# Patient Record
Sex: MALE | Race: WHITE | NOT HISPANIC OR LATINO | Employment: FULL TIME | URBAN - METROPOLITAN AREA
[De-identification: names, ages, dates, MRNs, and addresses within clinical notes are randomized per-mention and may not be internally consistent; named-entity substitution may affect disease eponyms.]

---

## 2019-06-13 ENCOUNTER — HOSPITAL ENCOUNTER (OUTPATIENT)
Dept: RADIOLOGY | Facility: MEDICAL CENTER | Age: 29
End: 2019-06-13

## 2019-06-13 ENCOUNTER — APPOINTMENT (OUTPATIENT)
Dept: RADIOLOGY | Facility: MEDICAL CENTER | Age: 29
DRG: 918 | End: 2019-06-13
Payer: COMMERCIAL

## 2019-06-13 ENCOUNTER — HOSPITAL ENCOUNTER (INPATIENT)
Facility: MEDICAL CENTER | Age: 29
LOS: 4 days | DRG: 918 | End: 2019-06-17
Attending: EMERGENCY MEDICINE | Admitting: SURGERY
Payer: COMMERCIAL

## 2019-06-13 DIAGNOSIS — T63.011A RATTLESNAKE BITE, ACCIDENTAL OR UNINTENTIONAL, INITIAL ENCOUNTER: ICD-10-CM

## 2019-06-13 PROBLEM — T14.90XA TRAUMA: Status: ACTIVE | Noted: 2019-06-13

## 2019-06-13 PROBLEM — Z53.09 CONTRAINDICATION TO DEEP VEIN THROMBOSIS (DVT) PROPHYLAXIS: Status: ACTIVE | Noted: 2019-06-13

## 2019-06-13 LAB
ABO + RH BLD: NORMAL
ABO GROUP BLD: NORMAL
ALBUMIN SERPL BCP-MCNC: 3.9 G/DL (ref 3.2–4.9)
ALBUMIN/GLOB SERPL: 1.6 G/DL
ALP SERPL-CCNC: 39 U/L (ref 30–99)
ALT SERPL-CCNC: 14 U/L (ref 2–50)
ANION GAP SERPL CALC-SCNC: 6 MMOL/L (ref 0–11.9)
APTT PPP: 25.5 SEC (ref 24.7–36)
AST SERPL-CCNC: 24 U/L (ref 12–45)
BILIRUB SERPL-MCNC: 0.7 MG/DL (ref 0.1–1.5)
BLD GP AB SCN SERPL QL: NORMAL
BUN SERPL-MCNC: 20 MG/DL (ref 8–22)
CALCIUM SERPL-MCNC: 8.3 MG/DL (ref 8.5–10.5)
CHLORIDE SERPL-SCNC: 108 MMOL/L (ref 96–112)
CK SERPL-CCNC: 242 U/L (ref 0–154)
CO2 SERPL-SCNC: 25 MMOL/L (ref 20–33)
CREAT SERPL-MCNC: 1.08 MG/DL (ref 0.5–1.4)
ERYTHROCYTE [DISTWIDTH] IN BLOOD BY AUTOMATED COUNT: 39.1 FL (ref 35.9–50)
ETHANOL BLD-MCNC: 0 G/DL
FIBRINOGEN PPP-MCNC: 240 MG/DL (ref 215–460)
GLOBULIN SER CALC-MCNC: 2.4 G/DL (ref 1.9–3.5)
GLUCOSE SERPL-MCNC: 96 MG/DL (ref 65–99)
HCT VFR BLD AUTO: 43.7 % (ref 42–52)
HGB BLD-MCNC: 14.1 G/DL (ref 14–18)
INR PPP: 1.08 (ref 0.87–1.13)
MCH RBC QN AUTO: 28 PG (ref 27–33)
MCHC RBC AUTO-ENTMCNC: 32.3 G/DL (ref 33.7–35.3)
MCV RBC AUTO: 86.9 FL (ref 81.4–97.8)
PLATELET # BLD AUTO: 268 K/UL (ref 164–446)
PMV BLD AUTO: 9.8 FL (ref 9–12.9)
POTASSIUM SERPL-SCNC: 4.2 MMOL/L (ref 3.6–5.5)
PROT SERPL-MCNC: 6.3 G/DL (ref 6–8.2)
PROTHROMBIN TIME: 14.2 SEC (ref 12–14.6)
RBC # BLD AUTO: 5.03 M/UL (ref 4.7–6.1)
RH BLD: NORMAL
SODIUM SERPL-SCNC: 139 MMOL/L (ref 135–145)
WBC # BLD AUTO: 14 K/UL (ref 4.8–10.8)

## 2019-06-13 PROCEDURE — 85384 FIBRINOGEN ACTIVITY: CPT

## 2019-06-13 PROCEDURE — 36415 COLL VENOUS BLD VENIPUNCTURE: CPT

## 2019-06-13 PROCEDURE — 700111 HCHG RX REV CODE 636 W/ 250 OVERRIDE (IP): Performed by: NURSE PRACTITIONER

## 2019-06-13 PROCEDURE — 86850 RBC ANTIBODY SCREEN: CPT

## 2019-06-13 PROCEDURE — 85730 THROMBOPLASTIN TIME PARTIAL: CPT

## 2019-06-13 PROCEDURE — 80053 COMPREHEN METABOLIC PANEL: CPT

## 2019-06-13 PROCEDURE — 700105 HCHG RX REV CODE 258: Performed by: NURSE PRACTITIONER

## 2019-06-13 PROCEDURE — 700102 HCHG RX REV CODE 250 W/ 637 OVERRIDE(OP): Performed by: NURSE PRACTITIONER

## 2019-06-13 PROCEDURE — 99291 CRITICAL CARE FIRST HOUR: CPT

## 2019-06-13 PROCEDURE — 82550 ASSAY OF CK (CPK): CPT

## 2019-06-13 PROCEDURE — 85027 COMPLETE CBC AUTOMATED: CPT

## 2019-06-13 PROCEDURE — 80307 DRUG TEST PRSMV CHEM ANLYZR: CPT

## 2019-06-13 PROCEDURE — 770022 HCHG ROOM/CARE - ICU (200)

## 2019-06-13 PROCEDURE — 85610 PROTHROMBIN TIME: CPT

## 2019-06-13 PROCEDURE — 86901 BLOOD TYPING SEROLOGIC RH(D): CPT

## 2019-06-13 PROCEDURE — G0390 TRAUMA RESPONS W/HOSP CRITI: HCPCS

## 2019-06-13 PROCEDURE — 86900 BLOOD TYPING SEROLOGIC ABO: CPT

## 2019-06-13 PROCEDURE — A9270 NON-COVERED ITEM OR SERVICE: HCPCS | Performed by: NURSE PRACTITIONER

## 2019-06-13 RX ORDER — MORPHINE SULFATE 4 MG/ML
4 INJECTION, SOLUTION INTRAMUSCULAR; INTRAVENOUS
Status: DISCONTINUED | OUTPATIENT
Start: 2019-06-13 | End: 2019-06-16

## 2019-06-13 RX ORDER — ONDANSETRON 2 MG/ML
4 INJECTION INTRAMUSCULAR; INTRAVENOUS EVERY 4 HOURS PRN
Status: DISCONTINUED | OUTPATIENT
Start: 2019-06-13 | End: 2019-06-17 | Stop reason: HOSPADM

## 2019-06-13 RX ORDER — SODIUM CHLORIDE 9 MG/ML
INJECTION, SOLUTION INTRAVENOUS CONTINUOUS
Status: DISCONTINUED | OUTPATIENT
Start: 2019-06-13 | End: 2019-06-16

## 2019-06-13 RX ORDER — DIPHENHYDRAMINE HYDROCHLORIDE 50 MG/ML
50 INJECTION INTRAMUSCULAR; INTRAVENOUS
Status: DISCONTINUED | OUTPATIENT
Start: 2019-06-13 | End: 2019-06-17 | Stop reason: HOSPADM

## 2019-06-13 RX ORDER — OXYCODONE HYDROCHLORIDE 5 MG/1
5 TABLET ORAL
Status: DISCONTINUED | OUTPATIENT
Start: 2019-06-13 | End: 2019-06-17 | Stop reason: HOSPADM

## 2019-06-13 RX ORDER — BISACODYL 10 MG
10 SUPPOSITORY, RECTAL RECTAL
Status: DISCONTINUED | OUTPATIENT
Start: 2019-06-13 | End: 2019-06-17 | Stop reason: HOSPADM

## 2019-06-13 RX ORDER — METHYLPREDNISOLONE SODIUM SUCCINATE 125 MG/2ML
125 INJECTION, POWDER, LYOPHILIZED, FOR SOLUTION INTRAMUSCULAR; INTRAVENOUS
Status: CANCELLED | OUTPATIENT
Start: 2019-06-13

## 2019-06-13 RX ORDER — AMOXICILLIN 250 MG
1 CAPSULE ORAL
Status: DISCONTINUED | OUTPATIENT
Start: 2019-06-13 | End: 2019-06-17 | Stop reason: HOSPADM

## 2019-06-13 RX ORDER — DOCUSATE SODIUM 100 MG/1
100 CAPSULE, LIQUID FILLED ORAL 2 TIMES DAILY
Status: DISCONTINUED | OUTPATIENT
Start: 2019-06-13 | End: 2019-06-17 | Stop reason: HOSPADM

## 2019-06-13 RX ORDER — DIPHENHYDRAMINE HYDROCHLORIDE 50 MG/ML
50 INJECTION INTRAMUSCULAR; INTRAVENOUS
Status: CANCELLED | OUTPATIENT
Start: 2019-06-13

## 2019-06-13 RX ORDER — POLYETHYLENE GLYCOL 3350 17 G/17G
1 POWDER, FOR SOLUTION ORAL 2 TIMES DAILY
Status: DISCONTINUED | OUTPATIENT
Start: 2019-06-13 | End: 2019-06-17 | Stop reason: HOSPADM

## 2019-06-13 RX ORDER — ENEMA 19; 7 G/133ML; G/133ML
1 ENEMA RECTAL
Status: DISCONTINUED | OUTPATIENT
Start: 2019-06-13 | End: 2019-06-17 | Stop reason: HOSPADM

## 2019-06-13 RX ORDER — SODIUM CHLORIDE 9 MG/ML
INJECTION, SOLUTION INTRAVENOUS CONTINUOUS
Status: CANCELLED | OUTPATIENT
Start: 2019-06-13

## 2019-06-13 RX ORDER — AMOXICILLIN 250 MG
1 CAPSULE ORAL NIGHTLY
Status: DISCONTINUED | OUTPATIENT
Start: 2019-06-13 | End: 2019-06-17 | Stop reason: HOSPADM

## 2019-06-13 RX ORDER — OXYCODONE HYDROCHLORIDE 10 MG/1
10 TABLET ORAL
Status: DISCONTINUED | OUTPATIENT
Start: 2019-06-13 | End: 2019-06-16

## 2019-06-13 RX ORDER — ACETAMINOPHEN 500 MG
1000 TABLET ORAL EVERY 6 HOURS
Status: DISCONTINUED | OUTPATIENT
Start: 2019-06-14 | End: 2019-06-17 | Stop reason: HOSPADM

## 2019-06-13 RX ORDER — ACETAMINOPHEN 500 MG
500 TABLET ORAL
COMMUNITY

## 2019-06-13 RX ORDER — METHYLPREDNISOLONE SODIUM SUCCINATE 125 MG/2ML
125 INJECTION, POWDER, LYOPHILIZED, FOR SOLUTION INTRAMUSCULAR; INTRAVENOUS
Status: DISCONTINUED | OUTPATIENT
Start: 2019-06-13 | End: 2019-06-17 | Stop reason: HOSPADM

## 2019-06-13 RX ADMIN — SODIUM CHLORIDE: 9 INJECTION, SOLUTION INTRAVENOUS at 23:02

## 2019-06-13 RX ADMIN — OVINE CROTALIDAE VENOMS IMMUNE FAB 2 VIAL: 1; 1; 1; 1 INJECTION, POWDER, LYOPHILIZED, FOR SOLUTION INTRAVENOUS at 23:40

## 2019-06-13 RX ADMIN — ACETAMINOPHEN 1000 MG: 500 TABLET ORAL at 23:11

## 2019-06-13 ASSESSMENT — LIFESTYLE VARIABLES
ALCOHOL_USE: YES
EVER_SMOKED: NEVER
CONSUMPTION TOTAL: INCOMPLETE
TOTAL SCORE: 0
HAVE PEOPLE ANNOYED YOU BY CRITICIZING YOUR DRINKING: NO
TOTAL SCORE: 0
EVER FELT BAD OR GUILTY ABOUT YOUR DRINKING: NO
DO YOU DRINK ALCOHOL: NO
EVER_SMOKED: NEVER
HAVE YOU EVER FELT YOU SHOULD CUT DOWN ON YOUR DRINKING: NO
TOTAL SCORE: 0
EVER HAD A DRINK FIRST THING IN THE MORNING TO STEADY YOUR NERVES TO GET RID OF A HANGOVER: NO

## 2019-06-13 ASSESSMENT — COPD QUESTIONNAIRES
COPD SCREENING SCORE: 0
IN THE PAST 12 MONTHS DO YOU DO LESS THAN YOU USED TO BECAUSE OF YOUR BREATHING PROBLEMS: DISAGREE/UNSURE
HAVE YOU SMOKED AT LEAST 100 CIGARETTES IN YOUR ENTIRE LIFE: NO/DON'T KNOW
DURING THE PAST 4 WEEKS HOW MUCH DID YOU FEEL SHORT OF BREATH: NONE/LITTLE OF THE TIME
COPD SCREENING SCORE: 0
DO YOU EVER COUGH UP ANY MUCUS OR PHLEGM?: NO/ONLY WITH OCCASIONAL COLDS OR INFECTIONS
HAVE YOU SMOKED AT LEAST 100 CIGARETTES IN YOUR ENTIRE LIFE: NO/DON'T KNOW
DO YOU EVER COUGH UP ANY MUCUS OR PHLEGM?: NO/ONLY WITH OCCASIONAL COLDS OR INFECTIONS
DURING THE PAST 4 WEEKS HOW MUCH DID YOU FEEL SHORT OF BREATH: NONE/LITTLE OF THE TIME

## 2019-06-13 ASSESSMENT — PATIENT HEALTH QUESTIONNAIRE - PHQ9
1. LITTLE INTEREST OR PLEASURE IN DOING THINGS: NOT AT ALL
2. FEELING DOWN, DEPRESSED, IRRITABLE, OR HOPELESS: NOT AT ALL
SUM OF ALL RESPONSES TO PHQ9 QUESTIONS 1 AND 2: 0

## 2019-06-13 NOTE — LETTER
[facility]  GEN SURGERY TAE 4TH  [facility] [facility]  [place] [address]  [phone]     June 17, 2019    Patient: Keny Stovall   YOB: 1990   Date of Visit: 6/13/2019       To Whom It May Concern:    Keny Stovall was seen and treated at [facility] from 6/13/19 to 6/17/19. He is to be excused from work for 2 weeks and will likely require light duty for an additional two weeks until cleared by his primary care provider before returning to work.     Sincerely,     GABRIEL Mast.

## 2019-06-14 LAB
ALBUMIN SERPL BCP-MCNC: 3.7 G/DL (ref 3.2–4.9)
ALBUMIN SERPL BCP-MCNC: 4 G/DL (ref 3.2–4.9)
ALBUMIN/GLOB SERPL: 1.9 G/DL
ALBUMIN/GLOB SERPL: 1.9 G/DL
ALP SERPL-CCNC: 41 U/L (ref 30–99)
ALP SERPL-CCNC: 44 U/L (ref 30–99)
ALT SERPL-CCNC: 13 U/L (ref 2–50)
ALT SERPL-CCNC: 13 U/L (ref 2–50)
ANION GAP SERPL CALC-SCNC: 11 MMOL/L (ref 0–11.9)
ANION GAP SERPL CALC-SCNC: 6 MMOL/L (ref 0–11.9)
APPEARANCE UR: CLEAR
AST SERPL-CCNC: 15 U/L (ref 12–45)
AST SERPL-CCNC: 21 U/L (ref 12–45)
BILIRUB SERPL-MCNC: 0.5 MG/DL (ref 0.1–1.5)
BILIRUB SERPL-MCNC: 0.9 MG/DL (ref 0.1–1.5)
BILIRUB UR QL STRIP.AUTO: NEGATIVE
BUN SERPL-MCNC: 17 MG/DL (ref 8–22)
BUN SERPL-MCNC: 19 MG/DL (ref 8–22)
CALCIUM SERPL-MCNC: 8.3 MG/DL (ref 8.5–10.5)
CALCIUM SERPL-MCNC: 8.4 MG/DL (ref 8.5–10.5)
CHLORIDE SERPL-SCNC: 107 MMOL/L (ref 96–112)
CHLORIDE SERPL-SCNC: 109 MMOL/L (ref 96–112)
CK SERPL-CCNC: 137 U/L (ref 0–154)
CO2 SERPL-SCNC: 21 MMOL/L (ref 20–33)
CO2 SERPL-SCNC: 26 MMOL/L (ref 20–33)
COLOR UR: YELLOW
CREAT SERPL-MCNC: 1.01 MG/DL (ref 0.5–1.4)
CREAT SERPL-MCNC: 1.05 MG/DL (ref 0.5–1.4)
EKG IMPRESSION: NORMAL
EKG IMPRESSION: NORMAL
ERYTHROCYTE [DISTWIDTH] IN BLOOD BY AUTOMATED COUNT: 38.1 FL (ref 35.9–50)
ERYTHROCYTE [DISTWIDTH] IN BLOOD BY AUTOMATED COUNT: 38.4 FL (ref 35.9–50)
ERYTHROCYTE [DISTWIDTH] IN BLOOD BY AUTOMATED COUNT: 39 FL (ref 35.9–50)
FIBRINOGEN PPP-MCNC: 220 MG/DL (ref 215–460)
FIBRINOGEN PPP-MCNC: 237 MG/DL (ref 215–460)
FIBRINOGEN PPP-MCNC: 241 MG/DL (ref 215–460)
FIBRINOGEN PPP-MCNC: 252 MG/DL (ref 215–460)
FIBRINOGEN PPP-MCNC: 266 MG/DL (ref 215–460)
GLOBULIN SER CALC-MCNC: 1.9 G/DL (ref 1.9–3.5)
GLOBULIN SER CALC-MCNC: 2.1 G/DL (ref 1.9–3.5)
GLUCOSE SERPL-MCNC: 112 MG/DL (ref 65–99)
GLUCOSE SERPL-MCNC: 94 MG/DL (ref 65–99)
GLUCOSE UR STRIP.AUTO-MCNC: NEGATIVE MG/DL
HCT VFR BLD AUTO: 40.3 % (ref 42–52)
HCT VFR BLD AUTO: 40.9 % (ref 42–52)
HCT VFR BLD AUTO: 44.3 % (ref 42–52)
HGB BLD-MCNC: 12.7 G/DL (ref 14–18)
HGB BLD-MCNC: 13.6 G/DL (ref 14–18)
HGB BLD-MCNC: 14.7 G/DL (ref 14–18)
INR PPP: 1.09 (ref 0.87–1.13)
INR PPP: 1.11 (ref 0.87–1.13)
INR PPP: 1.11 (ref 0.87–1.13)
INR PPP: 1.13 (ref 0.87–1.13)
INR PPP: 1.13 (ref 0.87–1.13)
KETONES UR STRIP.AUTO-MCNC: 15 MG/DL
LEUKOCYTE ESTERASE UR QL STRIP.AUTO: NEGATIVE
MCH RBC QN AUTO: 27.4 PG (ref 27–33)
MCH RBC QN AUTO: 28.4 PG (ref 27–33)
MCH RBC QN AUTO: 28.5 PG (ref 27–33)
MCHC RBC AUTO-ENTMCNC: 31.5 G/DL (ref 33.7–35.3)
MCHC RBC AUTO-ENTMCNC: 33.2 G/DL (ref 33.7–35.3)
MCHC RBC AUTO-ENTMCNC: 33.3 G/DL (ref 33.7–35.3)
MCV RBC AUTO: 85.4 FL (ref 81.4–97.8)
MCV RBC AUTO: 86 FL (ref 81.4–97.8)
MCV RBC AUTO: 86.9 FL (ref 81.4–97.8)
MICRO URNS: ABNORMAL
NITRITE UR QL STRIP.AUTO: NEGATIVE
PH UR STRIP.AUTO: 5.5 [PH]
PLATELET # BLD AUTO: 223 K/UL (ref 164–446)
PLATELET # BLD AUTO: 244 K/UL (ref 164–446)
PLATELET # BLD AUTO: 247 K/UL (ref 164–446)
PMV BLD AUTO: 10.2 FL (ref 9–12.9)
PMV BLD AUTO: 9.8 FL (ref 9–12.9)
PMV BLD AUTO: 9.8 FL (ref 9–12.9)
POTASSIUM SERPL-SCNC: 3.8 MMOL/L (ref 3.6–5.5)
POTASSIUM SERPL-SCNC: 3.9 MMOL/L (ref 3.6–5.5)
PROT SERPL-MCNC: 5.6 G/DL (ref 6–8.2)
PROT SERPL-MCNC: 6.1 G/DL (ref 6–8.2)
PROT UR QL STRIP: NEGATIVE MG/DL
PROTHROMBIN TIME: 14.4 SEC (ref 12–14.6)
PROTHROMBIN TIME: 14.5 SEC (ref 12–14.6)
PROTHROMBIN TIME: 14.6 SEC (ref 12–14.6)
PROTHROMBIN TIME: 14.8 SEC (ref 12–14.6)
PROTHROMBIN TIME: 14.8 SEC (ref 12–14.6)
RBC # BLD AUTO: 4.64 M/UL (ref 4.7–6.1)
RBC # BLD AUTO: 4.79 M/UL (ref 4.7–6.1)
RBC # BLD AUTO: 5.15 M/UL (ref 4.7–6.1)
RBC UR QL AUTO: NEGATIVE
SODIUM SERPL-SCNC: 139 MMOL/L (ref 135–145)
SODIUM SERPL-SCNC: 141 MMOL/L (ref 135–145)
SP GR UR STRIP.AUTO: 1.03
UROBILINOGEN UR STRIP.AUTO-MCNC: 1 MG/DL
WBC # BLD AUTO: 12.2 K/UL (ref 4.8–10.8)
WBC # BLD AUTO: 14.3 K/UL (ref 4.8–10.8)
WBC # BLD AUTO: 9.9 K/UL (ref 4.8–10.8)

## 2019-06-14 PROCEDURE — 93005 ELECTROCARDIOGRAM TRACING: CPT | Performed by: SURGERY

## 2019-06-14 PROCEDURE — A9270 NON-COVERED ITEM OR SERVICE: HCPCS | Performed by: NURSE PRACTITIONER

## 2019-06-14 PROCEDURE — 700102 HCHG RX REV CODE 250 W/ 637 OVERRIDE(OP): Performed by: NURSE PRACTITIONER

## 2019-06-14 PROCEDURE — 770022 HCHG ROOM/CARE - ICU (200)

## 2019-06-14 PROCEDURE — 85384 FIBRINOGEN ACTIVITY: CPT | Mod: 91

## 2019-06-14 PROCEDURE — 85610 PROTHROMBIN TIME: CPT | Mod: 91

## 2019-06-14 PROCEDURE — 700105 HCHG RX REV CODE 258: Performed by: SURGERY

## 2019-06-14 PROCEDURE — 80053 COMPREHEN METABOLIC PANEL: CPT | Mod: 91

## 2019-06-14 PROCEDURE — 93010 ELECTROCARDIOGRAM REPORT: CPT | Performed by: INTERNAL MEDICINE

## 2019-06-14 PROCEDURE — 700105 HCHG RX REV CODE 258: Performed by: NURSE PRACTITIONER

## 2019-06-14 PROCEDURE — 82550 ASSAY OF CK (CPK): CPT

## 2019-06-14 PROCEDURE — 700111 HCHG RX REV CODE 636 W/ 250 OVERRIDE (IP): Performed by: SURGERY

## 2019-06-14 PROCEDURE — 85027 COMPLETE CBC AUTOMATED: CPT

## 2019-06-14 PROCEDURE — 99233 SBSQ HOSP IP/OBS HIGH 50: CPT | Performed by: SURGERY

## 2019-06-14 PROCEDURE — 700111 HCHG RX REV CODE 636 W/ 250 OVERRIDE (IP): Performed by: NURSE PRACTITIONER

## 2019-06-14 PROCEDURE — 81003 URINALYSIS AUTO W/O SCOPE: CPT

## 2019-06-14 RX ADMIN — OVINE CROTALIDAE VENOMS IMMUNE FAB 4 VIAL: 1; 1; 1; 1 INJECTION, POWDER, LYOPHILIZED, FOR SOLUTION INTRAVENOUS at 20:11

## 2019-06-14 RX ADMIN — ACETAMINOPHEN 1000 MG: 500 TABLET ORAL at 12:43

## 2019-06-14 RX ADMIN — ACETAMINOPHEN 1000 MG: 500 TABLET ORAL at 23:10

## 2019-06-14 RX ADMIN — OVINE CROTALIDAE VENOMS IMMUNE FAB 2 VIAL: 1; 1; 1; 1 INJECTION, POWDER, LYOPHILIZED, FOR SOLUTION INTRAVENOUS at 06:21

## 2019-06-14 RX ADMIN — OVINE CROTALIDAE VENOMS IMMUNE FAB 2 VIAL: 1; 1; 1; 1 INJECTION, POWDER, LYOPHILIZED, FOR SOLUTION INTRAVENOUS at 12:42

## 2019-06-14 RX ADMIN — SODIUM CHLORIDE: 9 INJECTION, SOLUTION INTRAVENOUS at 21:32

## 2019-06-14 ASSESSMENT — ENCOUNTER SYMPTOMS
HEMATOLOGIC/LYMPHATIC NEGATIVE: 1
GASTROINTESTINAL NEGATIVE: 1
NEUROLOGICAL NEGATIVE: 1
CARDIOVASCULAR NEGATIVE: 1

## 2019-06-14 ASSESSMENT — PATIENT HEALTH QUESTIONNAIRE - PHQ9
SUM OF ALL RESPONSES TO PHQ9 QUESTIONS 1 AND 2: 0
1. LITTLE INTEREST OR PLEASURE IN DOING THINGS: NOT AT ALL
2. FEELING DOWN, DEPRESSED, IRRITABLE, OR HOPELESS: NOT AT ALL

## 2019-06-14 NOTE — PROGRESS NOTES
2 RN skin check complete with SANTANA Mccabe    Devices in place: BP cuff, SCD, oxygen probe and EKG leads  Areas concern: R foot bite galen and swelling, discoloration. Patient has redness up lateral RLE that is marked with sharpie q2 hours    PT turns self and pillows in place for positioning

## 2019-06-14 NOTE — ASSESSMENT & PLAN NOTE
Rattlesnake bite to right foot  Trauma Green Transfer Activation.  Kyrie Rivera MD. Trauma Surgery.

## 2019-06-14 NOTE — CARE PLAN
Problem: Pain Management  Goal: Pain level will decrease to patient's comfort goal    Intervention: Follow pain managment plan developed in collaboration with patient and Interdisciplinary Team  Pt c/o acute pain to RLE, increased pain with movement. Pt declines pain medication at this time. Foot and leg elevated for comfort.      Problem: Psychosocial Needs:  Goal: Level of anxiety will decrease    Intervention: Identify and develop with patient strategies to cope with anxiety triggers  Pt intermittently anxious, provided with emotional support, and education by RN.

## 2019-06-14 NOTE — H&P
"TRAUMA HISTORY AND PHYSICAL    CHIEF COMPLAINT:     HISTORY OF PRESENT ILLNESS: The patient is a 28 year old male who was bitten by a rattlesnake about 6 hours ago.  Seen in [place] where he receive antivenom.  Earlier he experienced some facial tingling but no mental status changes.  Currently the right foot and leg are swollen , pain has improved.           The patient was triaged as a trauma green     activation in accordance with established pre hospital protocols.  Upon arrival,   primary and secondary surveys with required adjuncts were performed by the ED physician.  .  Fibrinogen 240, Coagulation normal.        PAST MEDICAL HISTORY:   none    PAST SURGICAL HISTORY: patient denies any surgical history     ALLERGIES:   Allergies   Allergen Reactions   • Bee Venom         CURRENT MEDICATIONS:   Home Medications     Reviewed by Theresa Oviedo (Pharmacy Tech) on 06/13/19 at 2237  Med List Status: Complete   Medication Last Dose Status   acetaminophen (TYLENOL) 500 MG Tab <2weeks Active                FAMILY HISTORY: History reviewed. No pertinent family history.     SOCIAL HISTORY:   Social History     Social History Main Topics   • Smoking status: Never Smoker   • Smokeless tobacco: Never Used   • Alcohol use Yes      Comment: occ   • Drug use: No   • Sexual activity: Not on file       REVIEW OF SYSTEMS: Comprehensive review of systems is negative with the   exception of the aforementioned HPI, PMH, and PSH elements in accordance with CMS guidelines.     PHYSICAL EXAMINATION:     GENERAL: No distress  VITAL SIGNS: /72   Pulse (!) 51   Temp 36.9 °C (98.4 °F) (Temporal)   Resp 12   Ht 1.727 m (5' 8\")   Wt 79.4 kg (175 lb)   SpO2 98%   HEAD AND NECK: Pupils:  Equal and Reactive  Dentition: Occlusion is adequate  Facial:  Symmetrical.    NECK: No JVD. Trachea midline. Cervical tenderness is  absent   CHEST: Breath sounds are equal. No sternal tenderness.  No  lateral rib " tenderness.  CARDIOVASCULAR: Regular rhythm  ABDOMEN: Soft, no tenderness guarding or peritoneal findings.   PELVIS: Stable.  EXTREMITIES: Examination of the upper and lower extremities : No gross long bone or joint deformity.  Right foot and leg are swollen.  Minimal discomfort with passive and active planter and dorsi flexion.  Pedal pulses intact.  Sensation intact.    BACK: No midline stepoffs.  No Tenderness  NEUROLOGIC: Umang Coma Score is   .  No gross motor or sensory deficit.     LABORATORY VALUES:   Recent Labs      06/13/19 2051   WBC  14.0*   RBC  5.03   HEMOGLOBIN  14.1   HEMATOCRIT  43.7   MCV  86.9   MCH  28.0   MCHC  32.3*   RDW  39.1   PLATELETCT  268   MPV  9.8     Recent Labs      06/13/19 2051   SODIUM  139   POTASSIUM  4.2   CHLORIDE  108   CO2  25   GLUCOSE  96   BUN  20   CREATININE  1.08   CALCIUM  8.3*     Recent Labs      06/13/19 2051   ASTSGOT  24   ALTSGPT  14   TBILIRUBIN  0.7   ALKPHOSPHAT  39   GLOBULIN  2.4   INR  1.08     Recent Labs      06/13/19 2051   APTT  25.5   INR  1.08        IMAGING:   OUTSIDE IMAGES-DX LOWER EXTREMITY, RIGHT   Final Result          IMPRESSION AND PLAN:  Rattlesnake bite  Rattlesnake bite to right foot  Received 6 vials of CroFab at referring facility  Continue maintenance dose Crofab      Trauma  Rattlesnake bite  Trauma Green Transfer Activation.  Kyrie Rivera MD. Trauma Surgery.    Contraindication to deep vein thrombosis (DVT) prophylaxis  Systemic anticoagulation contraindicated secondary to elevated bleeding risk.  Consider surveillance venous duplex scanning if unable to initiate prophylactic anticoagulation within 48 hours of admission.    Snake bite right foot.  Some improvement in symptoms.  Elevate extremity , maintenance CroFab.  TICU for neurovascular checks.  Counseled.  Critical care 35 minutes.  Potential Limb threat, arrhthymias, coagulopathy, rhabdomyolysis, compartment syndrome.     ____________________________________   Kyrie  MD Miguel, FACS      DD: 6/13/2019   DT: 9:42 PM

## 2019-06-14 NOTE — PROGRESS NOTES
"TRAUMA TERTIARY SURVEY     Mental status adequate for full examination?: Yes    Spine cleared (radiologically and/or clinically): Yes    PHYSICAL EXAMINATION:  Vitals: /72   Pulse (!) 53   Temp 36.9 °C (98.4 °F) (Temporal)   Resp 17   Ht 1.727 m (5' 8\")   Wt 79.6 kg (175 lb 7.8 oz)   SpO2 96%   BMI 26.68 kg/m²   Constitutional:     General Appearance: appears stated age, is in no apparent distress, is well developed and well nourished.  HEENT:     No significant external craniofacial trauma. The pupils are equal, round, and reactive to light bilaterally. The extraocular muscles are intact bilaterally. The nares and oropharynx are clear. The midface and jaw are stable. No malocclusion is evident.  Neck:    The cervical spine is supple and non tender. Normal range of motion.  Respiratory:   Inspection: Unlabored respirations, no intercostal retractions, paradoxical motion, or accessory muscle use.   Palpation:  The chest is nontender. The clavicles are non deformed bilaterally.  Cardiovascular:   Peripheral Pulses: Normal.   Abdomen:   Abdomen is soft, nontender, without organomegaly or masses.  Genitourinary:   (MALE): Not visualized.  Musculoskeletal:   The pelvis is stable. Right lower extremity edema, ecchymosis to foot. Erythema up to thigh.  Back:   The thoracolumbar spine was examined. Examination is remarkable for no significant tenderness, swelling, or deformity in the thoracolumbar region.  Skin:   The skin is warm and dry.  Neurologic:    C[place]set[place] Coma Scale (GCS) 15 [id number]. Neurologic examination revealed no focal deficits noted, mental status intact.  Psychiatric:   The patient does not appear depressed or anxious.    IMAGING:  OUTSIDE IMAGES-DX LOWER EXTREMITY, RIGHT   Final Result        All current laboratory studies/radiology exams reviewed: Yes    Completed Consultations:  na     Pending Consultations:  none    Newly Identified Diagnoses and Injuries:  EKG obtained by nursing overnight " for bradycardia, ST segment elevation noted - to be discussed on rounds with ICU attending

## 2019-06-14 NOTE — PROGRESS NOTES
Patient complaining of sligtht chest discomfort/pain, Dr. Posey updated, no new orders at this time.  Will continue to monitor

## 2019-06-14 NOTE — PROGRESS NOTES
0615 JEREMIAH Albert at pt bedside. Updated on pt status. Per Bety pt okay to get up as comfortable.

## 2019-06-14 NOTE — PROGRESS NOTES
2 RN skin check complete with SANTANA Marinelli.    Devices in place: BP cuff, SCD, oxygen probe    Areas of concern: R foot with bite galen and swelling, discoloration. No other areas of concern.    Pt turns self and provided with pillows for offloading of bony prominences.

## 2019-06-14 NOTE — PROGRESS NOTES
"Trauma Progress Note     Briefly, this is a 28 y.o. male with a snakebite.    /72   Pulse (!) 42   Temp 37.2 °C (99 °F) (Temporal)   Resp 20   Ht 1.727 m (5' 8\")   Wt 79.6 kg (175 lb 7.8 oz)   SpO2 96%   BMI 26.68 kg/m²       Intake/Output Summary (Last 24 hours) at 06/14/19 0621  Last data filed at 06/14/19 0400   Gross per 24 hour   Intake             2200 ml   Output                0 ml   Net             2200 ml       Lab Results   Component Value Date/Time    WBC 12.2 (H) 06/14/2019 04:35 AM    RBC 4.79 06/14/2019 04:35 AM    HEMOGLOBIN 13.6 (L) 06/14/2019 04:35 AM    HEMATOCRIT 40.9 (L) 06/14/2019 04:35 AM    MCV 85.4 06/14/2019 04:35 AM    MCH 28.4 06/14/2019 04:35 AM    MCHC 33.3 (L) 06/14/2019 04:35 AM    MPV 9.8 06/14/2019 04:35 AM        Lab Results   Component Value Date/Time    SODIUM 139 06/14/2019 12:15 AM    POTASSIUM 3.9 06/14/2019 12:15 AM    CHLORIDE 107 06/14/2019 12:15 AM    CO2 21 06/14/2019 12:15 AM    GLUCOSE 94 06/14/2019 12:15 AM    BUN 19 06/14/2019 12:15 AM    CREATININE 1.01 06/14/2019 12:15 AM        Lab Results   Component Value Date/Time    PROTHROMBTM 14.8 (H) 06/14/2019 04:35 AM    INR 1.13 06/14/2019 04:35 AM              Assessment:  Edema to right foot improving  Palpable pulses to foot    Additional Plans:  Continue current plan of care      "

## 2019-06-14 NOTE — ED NOTES
Med Rec Updated and Complete per Pt at bedside  Allergies Reviewed  No PO ABX last 14 days.    Pt denies taking RX medication at this time.

## 2019-06-14 NOTE — PROGRESS NOTES
Patient presented in IDT rounds. Dr. Posey aware of 12 lead EKGs, swelling and tenderness on RLE. Labs and plan of care discussed. Will continue to monitor.

## 2019-06-14 NOTE — ED PROVIDER NOTES
"ED Provider Note    CHIEF COMPLAINT  Chief Complaint   Patient presents with   • Trauma Green     Snake bite transfer       HPI  Keny Stovall is a 28 y.o. male who presents for evaluation of a rattlesnake bite to the right foot that was sustained at 1615, went to an outside facility where he received, by report, 6 vials of CroFab and was transferred here.  He reports swelling and localized pain of the right foot as well as tingling involving his face, both hands and both feet.  He had abdominal pain that has since improved.  Patient otherwise healthy, he has no ongoing medical problems.  [place] and he saw this snake bite him, he describes a snake that is 2 to 3 foot long with audible rattling from its rattle.  He denies weakness or numbness.    REVIEW OF SYSTEMS  Negative for headache, neck pain, focal weakness, focal numbness, chest pain, back pain. All other systems are negative.     PAST MEDICAL HISTORY  History reviewed. No pertinent past medical history.    FAMILY HISTORY  History reviewed. No pertinent family history.    SOCIAL HISTORY  Social History   Substance Use Topics   • Smoking status: Never Smoker   • Smokeless tobacco: Never Used   • Alcohol use Yes      Comment: occ       SURGICAL HISTORY  History reviewed. No pertinent surgical history.    CURRENT MEDICATIONS  I personally reviewed the medication list in the charting documentation.     ALLERGIES  Allergies   Allergen Reactions   • Bee Venom        MEDICAL RECORD  I have reviewed patient's medical record and pertinent results are listed above.      PHYSICAL EXAM  VITAL SIGNS: /67   Pulse (!) 52   Temp 36.9 °C (98.4 °F) (Temporal)   Resp 17   Ht 1.727 m (5' 8\")   Wt 79.4 kg (175 lb)   SpO2 100%   BMI 26.61 kg/m²    Constitutional: An alert patient in no acute distress  HENT: Mucus membranes moist.  Oropharynx is clear. Head is atraumatic.  Eyes: Pupils are " equal and reactive to light. EOMI. Normal conjunctiva.    Neck: Nontender, comfortable full range of motion.  Cardiovascular: Regular heart rate and rhythm.   Thorax & Lungs: Chest is nontender. No ecchymosis, abrasions or other traumatic injury noted.  Lungs are clear to auscultation with good air movement bilaterally.  Abdomen: Soft, with no tenderness, rebound nor guarding.  No mass or pulsatile mass appreciated. No ecchymosis, abrasions or other traumatic injury noted.  Skin: Warm, dry. No rash appreciated  Extremities/Musculoskeletal: Edematous right foot with a puncture wound identified overlying the dorsal surface of the foot, normal dorsalis pedis pulse, normal sensation distally.  He does have some tenderness of the leg laterally but no tenderness of the calf.  Ink demarcation involving the extent of the edema  Neurologic: Alert & oriented. CN II-XII grossly intact. Normal and symmetric motor and sensory functions upper and lower extremities bilaterally. No focal deficits observed.   Psychiatric: Normal affect appropriate for the clinical situation.    DIAGNOSTIC STUDIES / PROCEDURES    LABS  Results for orders placed or performed during the hospital encounter of 06/13/19   DIAGNOSTIC ALCOHOL   Result Value Ref Range    Diagnostic Alcohol 0.00 0.00 g/dL   Comp Metabolic Panel   Result Value Ref Range    Sodium 139 135 - 145 mmol/L    Potassium 4.2 3.6 - 5.5 mmol/L    Chloride 108 96 - 112 mmol/L    Co2 25 20 - 33 mmol/L    Anion Gap 6.0 0.0 - 11.9    Glucose 96 65 - 99 mg/dL    Bun 20 8 - 22 mg/dL    Creatinine 1.08 0.50 - 1.40 mg/dL    Calcium 8.3 (L) 8.5 - 10.5 mg/dL    AST(SGOT) 24 12 - 45 U/L    ALT(SGPT) 14 2 - 50 U/L    Alkaline Phosphatase 39 30 - 99 U/L    Total Bilirubin 0.7 0.1 - 1.5 mg/dL    Albumin 3.9 3.2 - 4.9 g/dL    Total Protein 6.3 6.0 - 8.2 g/dL    Globulin 2.4 1.9 - 3.5 g/dL    A-G Ratio 1.6 g/dL   CBC WITHOUT DIFFERENTIAL   Result Value Ref Range    WBC 14.0 (H) 4.8 - 10.8 K/uL    RBC  5.03 4.70 - 6.10 M/uL    Hemoglobin 14.1 14.0 - 18.0 g/dL    Hematocrit 43.7 42.0 - 52.0 %    MCV 86.9 81.4 - 97.8 fL    MCH 28.0 27.0 - 33.0 pg    MCHC 32.3 (L) 33.7 - 35.3 g/dL    RDW 39.1 35.9 - 50.0 fL    Platelet Count 268 164 - 446 K/uL    MPV 9.8 9.0 - 12.9 fL   Prothrombin Time   Result Value Ref Range    PT 14.2 12.0 - 14.6 sec    INR 1.08 0.87 - 1.13   APTT   Result Value Ref Range    APTT 25.5 24.7 - 36.0 sec   FIBRINOGEN   Result Value Ref Range    Fibrinogen 240 215 - 460 mg/dL   CREATINE KINASE   Result Value Ref Range    CPK Total 242 (H) 0 - 154 U/L   ESTIMATED GFR   Result Value Ref Range    GFR If African American >60 >60 mL/min/1.73 m 2    GFR If Non African American >60 >60 mL/min/1.73 m 2        RADIOLOGY  OUTSIDE IMAGES-DX LOWER EXTREMITY, RIGHT   Final Result            COURSE & MEDICAL DECISION MAKING  I have reviewed any medical record information, laboratory studies and radiographic results as noted above.    Encounter Summary: This is a 28 y.o. male with an isolated rattlesnake bite to the right foot, received 6 vials of CroFab, states his symptoms have improved but he remains with some discomfort and swelling of the right foot, seems to have minimal proximal spreading at this point, he is neurovascularly intact.  Tingling involving the hands face and feet is present but improving, his abdominal pain has resolved.  Will trend his blood work specifically looking at his fibrinogen level and his coags ------ fibrinogen, coags and platelets are normal.  Patient has been admitted to the trauma team in guarded condition    DISPOSITION: Admit in guarded condition      FINAL IMPRESSION  1. Rattlesnake bite, accidental or unintentional, initial encounter           This dictation was created using voice recognition software. The accuracy of the dictation is limited to the abilities of the software. I expect there may be some errors of grammar and possibly content. The nursing notes were reviewed and  certain aspects of this information were incorporated into this note.    Electronically signed by: [place], 6/13/2019 9:16 PM

## 2019-06-14 NOTE — ED NOTES
Pt transferred to [place] [address]; Pt put on cardiac monitor; Pt vital signs stable at this time; Pt updated on plan of care, pt has no questions at this time.

## 2019-06-14 NOTE — ASSESSMENT & PLAN NOTE
Rattlesnake bite to right foot.  Received 6 vials of CroFab at referring facility.  Continue maintenance dose Crofab.  6/14 Maintenance dose completed - swelling and erythema later noted extending up the right posterior/ lateral thigh to gluteus - rebolus with Crofab and then restart maintenance infusion  6/15 Finish maintenance infusion and follow exam of RLE   Swelling and erythema continues to improve

## 2019-06-14 NOTE — CARE PLAN
Problem: Communication  Goal: The ability to communicate needs accurately and effectively will improve    Intervention: Educate patient and significant other/support system about the plan of care, procedures, treatments, medications and allow for questions  Discussed plan of care with patient, all questions and concerns answered. Demonstratied understanding.         Problem: Pain Management  Goal: Pain level will decrease to patient's comfort goal  Appropriate pain measuring tool used for assessment. Addressing patients pain needs with appropriate interventions. Assessing pain interventions every two hours or/and PRN.

## 2019-06-14 NOTE — ED TRIAGE NOTES
"Chief Complaint   Patient presents with   • Trauma Green     Snake bite transfer       /67   Pulse (!) 52   Temp 36.9 °C (98.4 °F) (Temporal)   Resp 17   Ht 1.727 m (5' 8\")   Wt 79.4 kg (175 lb)   SpO2 100%   BMI 26.61 kg/m²     Pt Hospital transfer from [facility]. Snake bite at 1620.  "

## 2019-06-14 NOTE — ASSESSMENT & PLAN NOTE
Systemic anticoagulation contraindicated secondary to elevated bleeding risk.  6/16  Trauma screening bilateral lower extremity venous duplex negative for above knee DVT.

## 2019-06-15 LAB
ALBUMIN SERPL BCP-MCNC: 3.3 G/DL (ref 3.2–4.9)
ALBUMIN/GLOB SERPL: 1.8 G/DL
ALP SERPL-CCNC: 37 U/L (ref 30–99)
ALT SERPL-CCNC: 11 U/L (ref 2–50)
ANION GAP SERPL CALC-SCNC: 7 MMOL/L (ref 0–11.9)
AST SERPL-CCNC: 15 U/L (ref 12–45)
BILIRUB SERPL-MCNC: 0.4 MG/DL (ref 0.1–1.5)
BUN SERPL-MCNC: 13 MG/DL (ref 8–22)
CALCIUM SERPL-MCNC: 8.1 MG/DL (ref 8.5–10.5)
CHLORIDE SERPL-SCNC: 110 MMOL/L (ref 96–112)
CO2 SERPL-SCNC: 22 MMOL/L (ref 20–33)
CREAT SERPL-MCNC: 0.93 MG/DL (ref 0.5–1.4)
ERYTHROCYTE [DISTWIDTH] IN BLOOD BY AUTOMATED COUNT: 38.4 FL (ref 35.9–50)
ERYTHROCYTE [DISTWIDTH] IN BLOOD BY AUTOMATED COUNT: 38.8 FL (ref 35.9–50)
ERYTHROCYTE [DISTWIDTH] IN BLOOD BY AUTOMATED COUNT: 39 FL (ref 35.9–50)
ERYTHROCYTE [DISTWIDTH] IN BLOOD BY AUTOMATED COUNT: 39.1 FL (ref 35.9–50)
ERYTHROCYTE [DISTWIDTH] IN BLOOD BY AUTOMATED COUNT: 39.2 FL (ref 35.9–50)
FIBRINOGEN PPP-MCNC: 240 MG/DL (ref 215–460)
FIBRINOGEN PPP-MCNC: 247 MG/DL (ref 215–460)
FIBRINOGEN PPP-MCNC: 251 MG/DL (ref 215–460)
FIBRINOGEN PPP-MCNC: 256 MG/DL (ref 215–460)
FIBRINOGEN PPP-MCNC: 284 MG/DL (ref 215–460)
GLOBULIN SER CALC-MCNC: 1.8 G/DL (ref 1.9–3.5)
GLUCOSE SERPL-MCNC: 112 MG/DL (ref 65–99)
HCT VFR BLD AUTO: 37.6 % (ref 42–52)
HCT VFR BLD AUTO: 38.5 % (ref 42–52)
HCT VFR BLD AUTO: 39.4 % (ref 42–52)
HCT VFR BLD AUTO: 39.6 % (ref 42–52)
HCT VFR BLD AUTO: 43.7 % (ref 42–52)
HGB BLD-MCNC: 12.2 G/DL (ref 14–18)
HGB BLD-MCNC: 12.5 G/DL (ref 14–18)
HGB BLD-MCNC: 12.5 G/DL (ref 14–18)
HGB BLD-MCNC: 12.7 G/DL (ref 14–18)
HGB BLD-MCNC: 13.8 G/DL (ref 14–18)
INR PPP: 1.05 (ref 0.87–1.13)
INR PPP: 1.08 (ref 0.87–1.13)
INR PPP: 1.09 (ref 0.87–1.13)
INR PPP: 1.11 (ref 0.87–1.13)
INR PPP: 1.12 (ref 0.87–1.13)
MCH RBC QN AUTO: 27.4 PG (ref 27–33)
MCH RBC QN AUTO: 27.4 PG (ref 27–33)
MCH RBC QN AUTO: 27.9 PG (ref 27–33)
MCH RBC QN AUTO: 28 PG (ref 27–33)
MCH RBC QN AUTO: 28.6 PG (ref 27–33)
MCHC RBC AUTO-ENTMCNC: 31.6 G/DL (ref 33.7–35.3)
MCHC RBC AUTO-ENTMCNC: 31.6 G/DL (ref 33.7–35.3)
MCHC RBC AUTO-ENTMCNC: 31.7 G/DL (ref 33.7–35.3)
MCHC RBC AUTO-ENTMCNC: 32.4 G/DL (ref 33.7–35.3)
MCHC RBC AUTO-ENTMCNC: 33 G/DL (ref 33.7–35.3)
MCV RBC AUTO: 86.2 FL (ref 81.4–97.8)
MCV RBC AUTO: 86.7 FL (ref 81.4–97.8)
MCV RBC AUTO: 86.7 FL (ref 81.4–97.8)
MCV RBC AUTO: 86.8 FL (ref 81.4–97.8)
MCV RBC AUTO: 87.9 FL (ref 81.4–97.8)
PLATELET # BLD AUTO: 183 K/UL (ref 164–446)
PLATELET # BLD AUTO: 189 K/UL (ref 164–446)
PLATELET # BLD AUTO: 199 K/UL (ref 164–446)
PLATELET # BLD AUTO: 200 K/UL (ref 164–446)
PLATELET # BLD AUTO: 234 K/UL (ref 164–446)
PMV BLD AUTO: 10 FL (ref 9–12.9)
PMV BLD AUTO: 10.1 FL (ref 9–12.9)
PMV BLD AUTO: 10.1 FL (ref 9–12.9)
PMV BLD AUTO: 10.2 FL (ref 9–12.9)
PMV BLD AUTO: 9.8 FL (ref 9–12.9)
POTASSIUM SERPL-SCNC: 4 MMOL/L (ref 3.6–5.5)
PROT SERPL-MCNC: 5.1 G/DL (ref 6–8.2)
PROTHROMBIN TIME: 13.9 SEC (ref 12–14.6)
PROTHROMBIN TIME: 14.3 SEC (ref 12–14.6)
PROTHROMBIN TIME: 14.3 SEC (ref 12–14.6)
PROTHROMBIN TIME: 14.6 SEC (ref 12–14.6)
PROTHROMBIN TIME: 14.7 SEC (ref 12–14.6)
RBC # BLD AUTO: 4.36 M/UL (ref 4.7–6.1)
RBC # BLD AUTO: 4.44 M/UL (ref 4.7–6.1)
RBC # BLD AUTO: 4.48 M/UL (ref 4.7–6.1)
RBC # BLD AUTO: 4.56 M/UL (ref 4.7–6.1)
RBC # BLD AUTO: 5.04 M/UL (ref 4.7–6.1)
SODIUM SERPL-SCNC: 139 MMOL/L (ref 135–145)
WBC # BLD AUTO: 7.1 K/UL (ref 4.8–10.8)
WBC # BLD AUTO: 7.2 K/UL (ref 4.8–10.8)
WBC # BLD AUTO: 7.4 K/UL (ref 4.8–10.8)
WBC # BLD AUTO: 8.5 K/UL (ref 4.8–10.8)
WBC # BLD AUTO: 8.7 K/UL (ref 4.8–10.8)

## 2019-06-15 PROCEDURE — 80053 COMPREHEN METABOLIC PANEL: CPT

## 2019-06-15 PROCEDURE — 85384 FIBRINOGEN ACTIVITY: CPT

## 2019-06-15 PROCEDURE — 700102 HCHG RX REV CODE 250 W/ 637 OVERRIDE(OP): Performed by: NURSE PRACTITIONER

## 2019-06-15 PROCEDURE — 85610 PROTHROMBIN TIME: CPT | Mod: 91

## 2019-06-15 PROCEDURE — 700105 HCHG RX REV CODE 258: Performed by: SURGERY

## 2019-06-15 PROCEDURE — 700105 HCHG RX REV CODE 258: Performed by: NURSE PRACTITIONER

## 2019-06-15 PROCEDURE — 85027 COMPLETE CBC AUTOMATED: CPT | Mod: 91

## 2019-06-15 PROCEDURE — 700111 HCHG RX REV CODE 636 W/ 250 OVERRIDE (IP): Performed by: SURGERY

## 2019-06-15 PROCEDURE — A9270 NON-COVERED ITEM OR SERVICE: HCPCS | Performed by: NURSE PRACTITIONER

## 2019-06-15 PROCEDURE — 99231 SBSQ HOSP IP/OBS SF/LOW 25: CPT | Performed by: SURGERY

## 2019-06-15 PROCEDURE — 770022 HCHG ROOM/CARE - ICU (200)

## 2019-06-15 RX ADMIN — ACETAMINOPHEN 1000 MG: 500 TABLET ORAL at 23:51

## 2019-06-15 RX ADMIN — SODIUM CHLORIDE: 9 INJECTION, SOLUTION INTRAVENOUS at 08:06

## 2019-06-15 RX ADMIN — ACETAMINOPHEN 1000 MG: 500 TABLET ORAL at 11:17

## 2019-06-15 RX ADMIN — SODIUM CHLORIDE: 9 INJECTION, SOLUTION INTRAVENOUS at 17:42

## 2019-06-15 RX ADMIN — ACETAMINOPHEN 1000 MG: 500 TABLET ORAL at 05:04

## 2019-06-15 RX ADMIN — OVINE CROTALIDAE VENOMS IMMUNE FAB 2 VIAL: 1; 1; 1; 1 INJECTION, POWDER, LYOPHILIZED, FOR SOLUTION INTRAVENOUS at 02:09

## 2019-06-15 RX ADMIN — OVINE CROTALIDAE VENOMS IMMUNE FAB 2 VIAL: 1; 1; 1; 1 INJECTION, POWDER, LYOPHILIZED, FOR SOLUTION INTRAVENOUS at 14:30

## 2019-06-15 RX ADMIN — OVINE CROTALIDAE VENOMS IMMUNE FAB 2 VIAL: 1; 1; 1; 1 INJECTION, POWDER, LYOPHILIZED, FOR SOLUTION INTRAVENOUS at 08:06

## 2019-06-15 ASSESSMENT — PATIENT HEALTH QUESTIONNAIRE - PHQ9
2. FEELING DOWN, DEPRESSED, IRRITABLE, OR HOPELESS: NOT AT ALL
SUM OF ALL RESPONSES TO PHQ9 QUESTIONS 1 AND 2: 0
1. LITTLE INTEREST OR PLEASURE IN DOING THINGS: NOT AT ALL

## 2019-06-15 ASSESSMENT — ENCOUNTER SYMPTOMS
NEUROLOGICAL NEGATIVE: 1
GASTROINTESTINAL NEGATIVE: 1
CARDIOVASCULAR NEGATIVE: 1
HEMATOLOGIC/LYMPHATIC NEGATIVE: 1

## 2019-06-15 NOTE — PROGRESS NOTES
2 RN skin check with Matheus RN.     Devices in place include BP Cuff, SCD (on left leg).  No skin breakdown present at site of devices. Areas of concern include Right leg from snake bite on right foot. Redness and swelling up the lateral side of right leg into the thigh. Areas marked with sharpie per protocol in order to keep track of swelling. Leg elevated with pillows. Elbows and heels floated with pillows. Sacrum intact. Patient repositions self frequently and with assistance/reminders throughout shift. Wound consult not indicated at this time.

## 2019-06-15 NOTE — CARE PLAN
Problem: Safety  Goal: Will remain free from injury    Intervention: Provide assistance with mobility  Educate patient on importance of calling for assistance. Mobilize patient during shift. Educate on importance of early mobilization in light of the healing process.       Problem: Pain Management  Goal: Pain level will decrease to patient's comfort goal    Intervention: Follow pain managment plan developed in collaboration with patient and Interdisciplinary Team  Educate patient on use of 1-10 pain scale as well as breakthrough pain. Administer pain medication as needed and as ordered.

## 2019-06-15 NOTE — PROGRESS NOTES
Trauma / Surgical Daily Progress Note    Date of Service  6/14/2019    Chief Complaint  28 y.o. male admitted 6/13/2019 with Trauma    Interval Events    Initial Crofab at sending facility  On maintenance dose here.  Increased swelling and erythema to RLE this evening  Rebolus with 4 vials then restart maintence infusion  Continue serial labs    Review of Systems  Review of Systems   Cardiovascular: Negative.    Gastrointestinal: Negative.    Neurological: Negative.    Hematological: Negative.         Vital Signs for last 24 hours  Temp:  [36.8 °C (98.3 °F)-37.2 °C (99 °F)] 37.2 °C (98.9 °F)  Pulse:  [41-76] 49  Resp:  [11-50] 17  BP: (143-155)/(67-72) 155/72  SpO2:  [94 %-100 %] 98 %    Hemodynamic parameters for last 24 hours       Respiratory Data     Respiration: 17, Pulse Oximetry: 98 %, O2 Daily Delivery Respiratory : Room Air with O2 Available     Work Of Breathing / Effort: Mild  RUL Breath Sounds: Clear, RML Breath Sounds: Clear, RLL Breath Sounds: Clear, SANDRA Breath Sounds: Clear, LLL Breath Sounds: Clear    Physical Exam  Physical Exam   Constitutional: He is oriented to person, place, and time. He appears well-developed and well-nourished.   HENT:   Head: Normocephalic and atraumatic.   Eyes: Pupils are equal, round, and reactive to light. No scleral icterus.   Neck: Normal range of motion. Neck supple. No tracheal deviation present.   Cardiovascular: Normal rate and regular rhythm.    Pulmonary/Chest: Effort normal and breath sounds normal. No respiratory distress.   Abdominal: Soft. Bowel sounds are normal. He exhibits no distension. There is no tenderness. There is no rebound.   Genitourinary:   Genitourinary Comments: voiding   Musculoskeletal: He exhibits edema (RLE) and tenderness (RLE).   Neurological: He is alert and oriented to person, place, and time.   Skin: Skin is warm and dry.   Nursing note and vitals reviewed.      Laboratory  Recent Results (from the past 24 hour(s))   DIAGNOSTIC ALCOHOL     Collection Time: 06/13/19  8:51 PM   Result Value Ref Range    Diagnostic Alcohol 0.00 0.00 g/dL   Comp Metabolic Panel    Collection Time: 06/13/19  8:51 PM   Result Value Ref Range    Sodium 139 135 - 145 mmol/L    Potassium 4.2 3.6 - 5.5 mmol/L    Chloride 108 96 - 112 mmol/L    Co2 25 20 - 33 mmol/L    Anion Gap 6.0 0.0 - 11.9    Glucose 96 65 - 99 mg/dL    Bun 20 8 - 22 mg/dL    Creatinine 1.08 0.50 - 1.40 mg/dL    Calcium 8.3 (L) 8.5 - 10.5 mg/dL    AST(SGOT) 24 12 - 45 U/L    ALT(SGPT) 14 2 - 50 U/L    Alkaline Phosphatase 39 30 - 99 U/L    Total Bilirubin 0.7 0.1 - 1.5 mg/dL    Albumin 3.9 3.2 - 4.9 g/dL    Total Protein 6.3 6.0 - 8.2 g/dL    Globulin 2.4 1.9 - 3.5 g/dL    A-G Ratio 1.6 g/dL   CBC WITHOUT DIFFERENTIAL    Collection Time: 06/13/19  8:51 PM   Result Value Ref Range    WBC 14.0 (H) 4.8 - 10.8 K/uL    RBC 5.03 4.70 - 6.10 M/uL    Hemoglobin 14.1 14.0 - 18.0 g/dL    Hematocrit 43.7 42.0 - 52.0 %    MCV 86.9 81.4 - 97.8 fL    MCH 28.0 27.0 - 33.0 pg    MCHC 32.3 (L) 33.7 - 35.3 g/dL    RDW 39.1 35.9 - 50.0 fL    Platelet Count 268 164 - 446 K/uL    MPV 9.8 9.0 - 12.9 fL   Prothrombin Time    Collection Time: 06/13/19  8:51 PM   Result Value Ref Range    PT 14.2 12.0 - 14.6 sec    INR 1.08 0.87 - 1.13   APTT    Collection Time: 06/13/19  8:51 PM   Result Value Ref Range    APTT 25.5 24.7 - 36.0 sec   FIBRINOGEN    Collection Time: 06/13/19  8:51 PM   Result Value Ref Range    Fibrinogen 240 215 - 460 mg/dL   CREATINE KINASE    Collection Time: 06/13/19  8:51 PM   Result Value Ref Range    CPK Total 242 (H) 0 - 154 U/L   COD - Adult (Type and Screen)    Collection Time: 06/13/19  8:51 PM   Result Value Ref Range    ABO Grouping Only B     Rh Grouping Only NEG     Antibody Screen-Cod NEG    ESTIMATED GFR    Collection Time: 06/13/19  8:51 PM   Result Value Ref Range    GFR If African American >60 >60 mL/min/1.73 m 2    GFR If Non African American >60 >60 mL/min/1.73 m 2   ABO Rh Confirm     Collection Time: 06/13/19  9:47 PM   Result Value Ref Range    ABO Rh Confirm B NEG    PT/INR EVERY 4 HOURS    Collection Time: 06/14/19 12:15 AM   Result Value Ref Range    PT 14.5 12.0 - 14.6 sec    INR 1.11 0.87 - 1.13   FIBRINOGEN    Collection Time: 06/14/19 12:15 AM   Result Value Ref Range    Fibrinogen 237 215 - 460 mg/dL   CBC WITHOUT DIFFERENTIAL EVERY 4 HOURS    Collection Time: 06/14/19 12:15 AM   Result Value Ref Range    WBC 14.3 (H) 4.8 - 10.8 K/uL    RBC 5.15 4.70 - 6.10 M/uL    Hemoglobin 14.7 14.0 - 18.0 g/dL    Hematocrit 44.3 42.0 - 52.0 %    MCV 86.0 81.4 - 97.8 fL    MCH 28.5 27.0 - 33.0 pg    MCHC 33.2 (L) 33.7 - 35.3 g/dL    RDW 38.4 35.9 - 50.0 fL    Platelet Count 247 164 - 446 K/uL    MPV 9.8 9.0 - 12.9 fL   Comp Metabolic Panel    Collection Time: 06/14/19 12:15 AM   Result Value Ref Range    Sodium 139 135 - 145 mmol/L    Potassium 3.9 3.6 - 5.5 mmol/L    Chloride 107 96 - 112 mmol/L    Co2 21 20 - 33 mmol/L    Anion Gap 11.0 0.0 - 11.9    Glucose 94 65 - 99 mg/dL    Bun 19 8 - 22 mg/dL    Creatinine 1.01 0.50 - 1.40 mg/dL    Calcium 8.3 (L) 8.5 - 10.5 mg/dL    AST(SGOT) 21 12 - 45 U/L    ALT(SGPT) 13 2 - 50 U/L    Alkaline Phosphatase 41 30 - 99 U/L    Total Bilirubin 0.9 0.1 - 1.5 mg/dL    Albumin 4.0 3.2 - 4.9 g/dL    Total Protein 6.1 6.0 - 8.2 g/dL    Globulin 2.1 1.9 - 3.5 g/dL    A-G Ratio 1.9 g/dL   ESTIMATED GFR    Collection Time: 06/14/19 12:15 AM   Result Value Ref Range    GFR If African American >60 >60 mL/min/1.73 m 2    GFR If Non African American >60 >60 mL/min/1.73 m 2   PT/INR EVERY 4 HOURS    Collection Time: 06/14/19  4:35 AM   Result Value Ref Range    PT 14.8 (H) 12.0 - 14.6 sec    INR 1.13 0.87 - 1.13   FIBRINOGEN    Collection Time: 06/14/19  4:35 AM   Result Value Ref Range    Fibrinogen 220 215 - 460 mg/dL   CBC WITHOUT DIFFERENTIAL EVERY 4 HOURS    Collection Time: 06/14/19  4:35 AM   Result Value Ref Range    WBC 12.2 (H) 4.8 - 10.8 K/uL    RBC 4.79 4.70 -  6.10 M/uL    Hemoglobin 13.6 (L) 14.0 - 18.0 g/dL    Hematocrit 40.9 (L) 42.0 - 52.0 %    MCV 85.4 81.4 - 97.8 fL    MCH 28.4 27.0 - 33.0 pg    MCHC 33.3 (L) 33.7 - 35.3 g/dL    RDW 38.1 35.9 - 50.0 fL    Platelet Count 244 164 - 446 K/uL    MPV 9.8 9.0 - 12.9 fL   URINALYSIS    Collection Time: 19  6:30 AM   Result Value Ref Range    Color Yellow     Character Clear     Specific Gravity 1.026 <1.035    Ph 5.5 5.0 - 8.0    Glucose Negative Negative mg/dL    Ketones 15 (A) Negative mg/dL    Protein Negative Negative mg/dL    Bilirubin Negative Negative    Urobilinogen, Urine 1.0 Negative    Nitrite Negative Negative    Leukocyte Esterase Negative Negative    Occult Blood Negative Negative    Micro Urine Req see below    EKG    Collection Time: 19  7:17 AM   Result Value Ref Range    Report       [facility]    Test Date:  2019  Pt Name:    GUERO CALDWELL            Department: 19  MRN:        [id number]                      Room:       [facility]  Gender:     Male                         Technician: SERAFINNICOLÁS  :        1990                   Requested By:SYED GONZALEZ  Order #:    [id number]                    Reading MD: Eugene Brown MD    Measurements  Intervals                                Axis  Rate:       45                           P:          59  DE:         152                          QRS:        82  QRSD:       96                           T:          56  QT:         483  QTc:        418    Interpretive Statements  SINUS BRADYCARDIA  ST ELEVATION SUGGESTS ACUTE PERICARDITIS  No previous ECG available for comparison    Electronically Signed On 2019 13:53:54 PDT by Eugene Brown MD     EKG    Collection Time: 19  8:09 AM   Result Value Ref Range    Report       [facility]    Test Date:  2019  Pt Name:    GUERO CALDWELL            Department: 19  MRN:        [id number]                      Room:       [facility]  Gender:     Male                          Technician: ROQUE  :        1990                   Requested By:SYED GONZALEZ  Order #:    [id number]                    Reading MD: Eugene Brown MD    Measurements  Intervals                                Axis  Rate:       49                           P:          41  KS:         157                          QRS:        81  QRSD:       91                           T:          63  QT:         475  QTc:        429    Interpretive Statements  SINUS BRADYCARDIA  ST ELEVATION SUGGESTS ACUTE PERICARDITIS  Compared to ECG 2019 07:17:59  No significant changes    Electronically Signed On 2019 13:54:00 PDT by Eugene Brown MD     PT/INR EVERY 4 HOURS    Collection Time: 19  9:01 AM   Result Value Ref Range    PT 14.6 12.0 - 14.6 sec    INR 1.11 0.87 - 1.13   FIBRINOGEN    Collection Time: 19  9:01 AM   Result Value Ref Range    Fibrinogen 252 215 - 460 mg/dL   PT/INR EVERY 4 HOURS    Collection Time: 19  5:42 PM   Result Value Ref Range    PT 14.4 12.0 - 14.6 sec    INR 1.09 0.87 - 1.13   FIBRINOGEN    Collection Time: 19  5:42 PM   Result Value Ref Range    Fibrinogen 266 215 - 460 mg/dL   Complete Metabolic Panel    Collection Time: 19  5:42 PM   Result Value Ref Range    Sodium 141 135 - 145 mmol/L    Potassium 3.8 3.6 - 5.5 mmol/L    Chloride 109 96 - 112 mmol/L    Co2 26 20 - 33 mmol/L    Anion Gap 6.0 0.0 - 11.9    Glucose 112 (H) 65 - 99 mg/dL    Bun 17 8 - 22 mg/dL    Creatinine 1.05 0.50 - 1.40 mg/dL    Calcium 8.4 (L) 8.5 - 10.5 mg/dL    AST(SGOT) 15 12 - 45 U/L    ALT(SGPT) 13 2 - 50 U/L    Alkaline Phosphatase 44 30 - 99 U/L    Total Bilirubin 0.5 0.1 - 1.5 mg/dL    Albumin 3.7 3.2 - 4.9 g/dL    Total Protein 5.6 (L) 6.0 - 8.2 g/dL    Globulin 1.9 1.9 - 3.5 g/dL    A-G Ratio 1.9 g/dL   CREATININE KINASE    Collection Time: 19  5:42 PM   Result Value Ref Range    CPK Total 137 0 - 154 U/L   ESTIMATED GFR    Collection Time: 19  5:42 PM   Result  Value Ref Range    GFR If African American >60 >60 mL/min/1.73 m 2    GFR If Non African American >60 >60 mL/min/1.73 m 2   CBC WITHOUT DIFFERENTIAL EVERY 4 HOURS    Collection Time: 06/14/19  7:33 PM   Result Value Ref Range    WBC 9.9 4.8 - 10.8 K/uL    RBC 4.64 (L) 4.70 - 6.10 M/uL    Hemoglobin 12.7 (L) 14.0 - 18.0 g/dL    Hematocrit 40.3 (L) 42.0 - 52.0 %    MCV 86.9 81.4 - 97.8 fL    MCH 27.4 27.0 - 33.0 pg    MCHC 31.5 (L) 33.7 - 35.3 g/dL    RDW 39.0 35.9 - 50.0 fL    Platelet Count 223 164 - 446 K/uL    MPV 10.2 9.0 - 12.9 fL       Fluids    Intake/Output Summary (Last 24 hours) at 06/14/19 2016  Last data filed at 06/14/19 1800   Gross per 24 hour   Intake             5600 ml   Output             1400 ml   Net             4200 ml       Core Measures & Quality Metrics  Labs reviewed and Medications reviewed  Randle catheter: No Randle      DVT Prophylaxis: Contraindicated - High bleeding risk  DVT prophylaxis - mechanical: SCDs  Ulcer prophylaxis: Not indicated        LARISA Score  ETOH Screening    Assessment/Plan  Rattlesnake bite- (present on admission)   Assessment & Plan    Rattlesnake bite to right foot.  Received 6 vials of CroFab at referring facility.  Continue maintenance dose Crofab.  6/14 maintenance dose completed - swelling and erythema later noted extending up    the right posterior/ lateral thigh to gluteus - rebolus with Crofab and then restart maintenance infusion     Contraindication to deep vein thrombosis (DVT) prophylaxis- (present on admission)   Assessment & Plan    Systemic anticoagulation contraindicated secondary to elevated bleeding risk.  Consider surveillance venous duplex scanning if unable to initiate prophylactic anticoagulation within 48 hours of admission.     Trauma- (present on admission)   Assessment & Plan    Rattlesnake bite to right foot  Trauma Green Transfer Activation.  Kyrie Rivera MD. Trauma Surgery.         Discussed patient condition with Family, RN, RT, Pharmacy,  Patient and trauma surgery.  CRITICAL CARE TIME EXCLUDING PROCEDURES: 40  minutes

## 2019-06-15 NOTE — PROGRESS NOTES
1015: Interdisciplinary team at bedside. Continue q4h CBC, fibrinogen, and INR until doses of anti-venom medication is finished. VS stable, will continue to monitor.

## 2019-06-15 NOTE — PROGRESS NOTES
Spoke with pharmacy about missing antivenom; per pharmacy still mixing and will be brought up soon

## 2019-06-15 NOTE — CARE PLAN
Problem: Knowledge Deficit  Goal: Knowledge of disease process/condition, treatment plan, diagnostic tests, and medications will improve    Intervention: Assess knowledge level of disease process/condition, treatment plan, diagnostic tests, and medications  Pt aware of plan of care, questions answered, education provided.      Problem: Psychosocial Needs:  Goal: Level of anxiety will decrease    Intervention: Identify and develop with patient strategies to cope with anxiety triggers  Emotional support provided.

## 2019-06-15 NOTE — PROGRESS NOTES
2 RN skin check complete with SANTANA Mccabe.  Devices in place SCD, pulse ox, BP cuff.   Skin assessed under the above devices.   Preventative measures in place including pillows.  Following areas of concern:   · Right lower extremity redness, swelling   Right foot bite galen, open to air. No s/s infection.  The following interventions in place right leg elevated on pillow    Wound consult placedYES/NO: N\A    Wound reported YES/NO: yes  Appropriate LDAs opened YES/NO: yes

## 2019-06-16 ENCOUNTER — APPOINTMENT (OUTPATIENT)
Dept: RADIOLOGY | Facility: MEDICAL CENTER | Age: 29
DRG: 918 | End: 2019-06-16
Attending: SURGERY
Payer: COMMERCIAL

## 2019-06-16 LAB
ALBUMIN SERPL BCP-MCNC: 3.1 G/DL (ref 3.2–4.9)
ALBUMIN/GLOB SERPL: 1.6 G/DL
ALP SERPL-CCNC: 34 U/L (ref 30–99)
ALT SERPL-CCNC: 9 U/L (ref 2–50)
ANION GAP SERPL CALC-SCNC: 4 MMOL/L (ref 0–11.9)
AST SERPL-CCNC: 13 U/L (ref 12–45)
BILIRUB SERPL-MCNC: 0.5 MG/DL (ref 0.1–1.5)
BUN SERPL-MCNC: 11 MG/DL (ref 8–22)
CALCIUM SERPL-MCNC: 8.1 MG/DL (ref 8.5–10.5)
CHLORIDE SERPL-SCNC: 111 MMOL/L (ref 96–112)
CO2 SERPL-SCNC: 22 MMOL/L (ref 20–33)
CREAT SERPL-MCNC: 0.82 MG/DL (ref 0.5–1.4)
ERYTHROCYTE [DISTWIDTH] IN BLOOD BY AUTOMATED COUNT: 37.4 FL (ref 35.9–50)
GLOBULIN SER CALC-MCNC: 1.9 G/DL (ref 1.9–3.5)
GLUCOSE SERPL-MCNC: 103 MG/DL (ref 65–99)
HCT VFR BLD AUTO: 35.3 % (ref 42–52)
HGB BLD-MCNC: 11.9 G/DL (ref 14–18)
MCH RBC QN AUTO: 28.9 PG (ref 27–33)
MCHC RBC AUTO-ENTMCNC: 33.7 G/DL (ref 33.7–35.3)
MCV RBC AUTO: 85.7 FL (ref 81.4–97.8)
PLATELET # BLD AUTO: 198 K/UL (ref 164–446)
PMV BLD AUTO: 10.1 FL (ref 9–12.9)
POTASSIUM SERPL-SCNC: 3.8 MMOL/L (ref 3.6–5.5)
PROT SERPL-MCNC: 5 G/DL (ref 6–8.2)
RBC # BLD AUTO: 4.12 M/UL (ref 4.7–6.1)
SODIUM SERPL-SCNC: 137 MMOL/L (ref 135–145)
WBC # BLD AUTO: 6.5 K/UL (ref 4.8–10.8)

## 2019-06-16 PROCEDURE — 85027 COMPLETE CBC AUTOMATED: CPT

## 2019-06-16 PROCEDURE — 80053 COMPREHEN METABOLIC PANEL: CPT

## 2019-06-16 PROCEDURE — A9270 NON-COVERED ITEM OR SERVICE: HCPCS | Performed by: NURSE PRACTITIONER

## 2019-06-16 PROCEDURE — 700102 HCHG RX REV CODE 250 W/ 637 OVERRIDE(OP): Performed by: NURSE PRACTITIONER

## 2019-06-16 PROCEDURE — 700112 HCHG RX REV CODE 229: Performed by: NURSE PRACTITIONER

## 2019-06-16 PROCEDURE — 700105 HCHG RX REV CODE 258: Performed by: NURSE PRACTITIONER

## 2019-06-16 PROCEDURE — 99231 SBSQ HOSP IP/OBS SF/LOW 25: CPT | Performed by: SURGERY

## 2019-06-16 PROCEDURE — 770006 HCHG ROOM/CARE - MED/SURG/GYN SEMI*

## 2019-06-16 PROCEDURE — 93970 EXTREMITY STUDY: CPT

## 2019-06-16 RX ADMIN — ACETAMINOPHEN 1000 MG: 500 TABLET ORAL at 23:57

## 2019-06-16 RX ADMIN — DOCUSATE SODIUM 100 MG: 100 CAPSULE, LIQUID FILLED ORAL at 17:02

## 2019-06-16 RX ADMIN — ACETAMINOPHEN 1000 MG: 500 TABLET ORAL at 12:53

## 2019-06-16 RX ADMIN — ACETAMINOPHEN 1000 MG: 500 TABLET ORAL at 05:07

## 2019-06-16 RX ADMIN — DOCUSATE SODIUM 100 MG: 100 CAPSULE, LIQUID FILLED ORAL at 05:07

## 2019-06-16 RX ADMIN — SODIUM CHLORIDE: 9 INJECTION, SOLUTION INTRAVENOUS at 05:10

## 2019-06-16 RX ADMIN — ACETAMINOPHEN 1000 MG: 500 TABLET ORAL at 16:57

## 2019-06-16 RX ADMIN — SENNOSIDES,DOCUSATE SODIUM 1 TABLET: 8.6; 5 TABLET, FILM COATED ORAL at 20:38

## 2019-06-16 ASSESSMENT — COGNITIVE AND FUNCTIONAL STATUS - GENERAL
MOBILITY SCORE: 19
HELP NEEDED FOR BATHING: A LITTLE
DAILY ACTIVITIY SCORE: 23
CLIMB 3 TO 5 STEPS WITH RAILING: A LITTLE
MOVING FROM LYING ON BACK TO SITTING ON SIDE OF FLAT BED: A LITTLE
STANDING UP FROM CHAIR USING ARMS: A LITTLE
SUGGESTED CMS G CODE MODIFIER DAILY ACTIVITY: CI
WALKING IN HOSPITAL ROOM: A LITTLE
TURNING FROM BACK TO SIDE WHILE IN FLAT BAD: A LITTLE
SUGGESTED CMS G CODE MODIFIER MOBILITY: CK

## 2019-06-16 ASSESSMENT — LIFESTYLE VARIABLES
ALCOHOL_USE: YES
TOTAL SCORE: 0
ON A TYPICAL DAY WHEN YOU DRINK ALCOHOL HOW MANY DRINKS DO YOU HAVE: 2
TOTAL SCORE: 0
EVER HAD A DRINK FIRST THING IN THE MORNING TO STEADY YOUR NERVES TO GET RID OF A HANGOVER: NO
HAVE PEOPLE ANNOYED YOU BY CRITICIZING YOUR DRINKING: NO
AVERAGE NUMBER OF DAYS PER WEEK YOU HAVE A DRINK CONTAINING ALCOHOL: 0
CONSUMPTION TOTAL: NEGATIVE
EVER FELT BAD OR GUILTY ABOUT YOUR DRINKING: NO
TOTAL SCORE: 0
HAVE YOU EVER FELT YOU SHOULD CUT DOWN ON YOUR DRINKING: NO
HOW MANY TIMES IN THE PAST YEAR HAVE YOU HAD 5 OR MORE DRINKS IN A DAY: 0

## 2019-06-16 ASSESSMENT — ENCOUNTER SYMPTOMS
SENSORY CHANGE: 0
DOUBLE VISION: 0
ABDOMINAL PAIN: 0
HEADACHES: 0
CONSTIPATION: 0
SHORTNESS OF BREATH: 0
FEVER: 0
NAUSEA: 0
BLURRED VISION: 0
ROS GI COMMENTS: BM 6/14
VOMITING: 0
COUGH: 0

## 2019-06-16 ASSESSMENT — PATIENT HEALTH QUESTIONNAIRE - PHQ9
SUM OF ALL RESPONSES TO PHQ9 QUESTIONS 1 AND 2: 0
2. FEELING DOWN, DEPRESSED, IRRITABLE, OR HOPELESS: NOT AT ALL
1. LITTLE INTEREST OR PLEASURE IN DOING THINGS: NOT AT ALL

## 2019-06-16 NOTE — PROGRESS NOTES
2 RN skin check with Matheus RN.      Devices in place include BP Cuff, SCD (on left leg), pulse ox.  No skin breakdown present at site of devices.   Areas of concern include Right leg from snake bite on right foot. No s/s of infection at site of snake bite, open to air. Redness and swelling up the lateral side of right leg into the thigh. Areas marked with sharpie per protocol in order to keep track of swelling. Leg elevated with pillows. Elbows and heels floated with pillows. Sacrum intact. Patient repositions self frequently and with assistance/reminders throughout shift. Wound consult not indicated at this time.

## 2019-06-16 NOTE — PROGRESS NOTES
2 RN skin check complete with SANTANA Garcia.    Devices in place:   -PIV to right and left AC's.   -SCD to LLE, (on when in bed, off out of bed per patient request)                Skin assessed under the following devices:     -All the mentioned devices above including bony prominences                Preventative measures in place including:     -Increased mobility/activity out of bed     -Patient independently turns, encouraging Q2h     -Low air loss mattress     -Elevating heels      -Monitoring RLE     Following areas of concern:    -Right foot with puncture/bite wound from PTA, bruised   -Bruising noted to right lateral thigh extending to posterior thigh    -Monitoring swelling and highlighting induration    Wound consult placedYES/NO: N/a  Wound reported YES/NO: Yes  Appropriate LDAs opened YES/NO:Yes, previously

## 2019-06-16 NOTE — PROGRESS NOTES
Trauma / Surgical Daily Progress Note    Date of Service  6/15/2019    Chief Complaint  28 y.o. male admitted 6/13/2019 with Trauma    Interval Events  Significant decrease in pain and swelling of RLE since rebolus of Crofab  Decreased pain in RLE  DF / PF without pain  Finish maintenance infusion of Crofab and follow    Review of Systems  Review of Systems   Cardiovascular: Negative.    Gastrointestinal: Negative.    Musculoskeletal:        LLE pain and swelling   Neurological: Negative.    Hematological: Negative.         Vital Signs for last 24 hours  Temp:  [36.6 °C (97.8 °F)-37.1 °C (98.7 °F)] 36.6 °C (97.8 °F)  Pulse:  [36-68] 59  Resp:  [9-26] 16  SpO2:  [95 %-100 %] 98 %    Hemodynamic parameters for last 24 hours       Respiratory Data     Respiration: 16, Pulse Oximetry: 98 %        RUL Breath Sounds: Clear, RML Breath Sounds: Clear, RLL Breath Sounds: Clear, SANDRA Breath Sounds: Clear, LLL Breath Sounds: Clear    Physical Exam  Physical Exam   Constitutional: He is oriented to person, place, and time. He appears well-developed and well-nourished.   HENT:   Head: Normocephalic and atraumatic.   Eyes: Pupils are equal, round, and reactive to light. No scleral icterus.   Neck: Normal range of motion. Neck supple. No tracheal deviation present.   Cardiovascular: Normal rate and regular rhythm.    Pulmonary/Chest: Effort normal and breath sounds normal. No respiratory distress.   Abdominal: Soft. Bowel sounds are normal. He exhibits no distension. There is no tenderness. There is no rebound.   Genitourinary:   Genitourinary Comments: voiding   Musculoskeletal: He exhibits edema (RLE) and tenderness (RLE).   Neurological: He is alert and oriented to person, place, and time.   Skin: Skin is warm and dry.   Nursing note and vitals reviewed.      Laboratory  Recent Results (from the past 24 hour(s))   PT/INR EVERY 4 HOURS    Collection Time: 06/14/19 10:09 PM   Result Value Ref Range    PT 14.8 (H) 12.0 - 14.6 sec     INR 1.13 0.87 - 1.13   FIBRINOGEN    Collection Time: 06/14/19 10:09 PM   Result Value Ref Range    Fibrinogen 241 215 - 460 mg/dL   CBC WITHOUT DIFFERENTIAL EVERY 4 HOURS    Collection Time: 06/15/19  2:32 AM   Result Value Ref Range    WBC 8.5 4.8 - 10.8 K/uL    RBC 4.48 (L) 4.70 - 6.10 M/uL    Hemoglobin 12.5 (L) 14.0 - 18.0 g/dL    Hematocrit 39.4 (L) 42.0 - 52.0 %    MCV 87.9 81.4 - 97.8 fL    MCH 27.9 27.0 - 33.0 pg    MCHC 31.7 (L) 33.7 - 35.3 g/dL    RDW 39.2 35.9 - 50.0 fL    Platelet Count 189 164 - 446 K/uL    MPV 9.8 9.0 - 12.9 fL   PT/INR EVERY 4 HOURS    Collection Time: 06/15/19  2:32 AM   Result Value Ref Range    PT 14.7 (H) 12.0 - 14.6 sec    INR 1.12 0.87 - 1.13   FIBRINOGEN    Collection Time: 06/15/19  2:32 AM   Result Value Ref Range    Fibrinogen 240 215 - 460 mg/dL   Comp Metabolic Panel    Collection Time: 06/15/19  6:16 AM   Result Value Ref Range    Sodium 139 135 - 145 mmol/L    Potassium 4.0 3.6 - 5.5 mmol/L    Chloride 110 96 - 112 mmol/L    Co2 22 20 - 33 mmol/L    Anion Gap 7.0 0.0 - 11.9    Glucose 112 (H) 65 - 99 mg/dL    Bun 13 8 - 22 mg/dL    Creatinine 0.93 0.50 - 1.40 mg/dL    Calcium 8.1 (L) 8.5 - 10.5 mg/dL    AST(SGOT) 15 12 - 45 U/L    ALT(SGPT) 11 2 - 50 U/L    Alkaline Phosphatase 37 30 - 99 U/L    Total Bilirubin 0.4 0.1 - 1.5 mg/dL    Albumin 3.3 3.2 - 4.9 g/dL    Total Protein 5.1 (L) 6.0 - 8.2 g/dL    Globulin 1.8 (L) 1.9 - 3.5 g/dL    A-G Ratio 1.8 g/dL   CBC WITHOUT DIFFERENTIAL    Collection Time: 06/15/19  6:16 AM   Result Value Ref Range    WBC 7.2 4.8 - 10.8 K/uL    RBC 4.44 (L) 4.70 - 6.10 M/uL    Hemoglobin 12.7 (L) 14.0 - 18.0 g/dL    Hematocrit 38.5 (L) 42.0 - 52.0 %    MCV 86.7 81.4 - 97.8 fL    MCH 28.6 27.0 - 33.0 pg    MCHC 33.0 (L) 33.7 - 35.3 g/dL    RDW 38.8 35.9 - 50.0 fL    Platelet Count 199 164 - 446 K/uL    MPV 10.1 9.0 - 12.9 fL   FIBRINOGEN    Collection Time: 06/15/19  6:16 AM   Result Value Ref Range    Fibrinogen 256 215 - 460 mg/dL    Prothrombin Time    Collection Time: 06/15/19  6:16 AM   Result Value Ref Range    PT 14.3 12.0 - 14.6 sec    INR 1.09 0.87 - 1.13   ESTIMATED GFR    Collection Time: 06/15/19  6:16 AM   Result Value Ref Range    GFR If African American >60 >60 mL/min/1.73 m 2    GFR If Non African American >60 >60 mL/min/1.73 m 2   CBC WITHOUT DIFFERENTIAL    Collection Time: 06/15/19 10:15 AM   Result Value Ref Range    WBC 7.1 4.8 - 10.8 K/uL    RBC 4.56 (L) 4.70 - 6.10 M/uL    Hemoglobin 12.5 (L) 14.0 - 18.0 g/dL    Hematocrit 39.6 (L) 42.0 - 52.0 %    MCV 86.8 81.4 - 97.8 fL    MCH 27.4 27.0 - 33.0 pg    MCHC 31.6 (L) 33.7 - 35.3 g/dL    RDW 39.1 35.9 - 50.0 fL    Platelet Count 200 164 - 446 K/uL    MPV 10.1 9.0 - 12.9 fL   FIBRINOGEN    Collection Time: 06/15/19 10:15 AM   Result Value Ref Range    Fibrinogen 251 215 - 460 mg/dL   Prothrombin Time    Collection Time: 06/15/19 10:15 AM   Result Value Ref Range    PT 14.3 12.0 - 14.6 sec    INR 1.08 0.87 - 1.13   CBC WITHOUT DIFFERENTIAL    Collection Time: 06/15/19  2:10 PM   Result Value Ref Range    WBC 7.4 4.8 - 10.8 K/uL    RBC 5.04 4.70 - 6.10 M/uL    Hemoglobin 13.8 (L) 14.0 - 18.0 g/dL    Hematocrit 43.7 42.0 - 52.0 %    MCV 86.7 81.4 - 97.8 fL    MCH 27.4 27.0 - 33.0 pg    MCHC 31.6 (L) 33.7 - 35.3 g/dL    RDW 39.0 35.9 - 50.0 fL    Platelet Count 234 164 - 446 K/uL    MPV 10.2 9.0 - 12.9 fL   FIBRINOGEN    Collection Time: 06/15/19  2:10 PM   Result Value Ref Range    Fibrinogen 284 215 - 460 mg/dL   Prothrombin Time    Collection Time: 06/15/19  2:10 PM   Result Value Ref Range    PT 13.9 12.0 - 14.6 sec    INR 1.05 0.87 - 1.13   CBC WITHOUT DIFFERENTIAL    Collection Time: 06/15/19  6:00 PM   Result Value Ref Range    WBC 8.7 4.8 - 10.8 K/uL    RBC 4.36 (L) 4.70 - 6.10 M/uL    Hemoglobin 12.2 (L) 14.0 - 18.0 g/dL    Hematocrit 37.6 (L) 42.0 - 52.0 %    MCV 86.2 81.4 - 97.8 fL    MCH 28.0 27.0 - 33.0 pg    MCHC 32.4 (L) 33.7 - 35.3 g/dL    RDW 38.4 35.9 - 50.0 fL     Platelet Count 183 164 - 446 K/uL    MPV 10.0 9.0 - 12.9 fL   FIBRINOGEN    Collection Time: 06/15/19  6:00 PM   Result Value Ref Range    Fibrinogen 247 215 - 460 mg/dL   Prothrombin Time    Collection Time: 06/15/19  6:00 PM   Result Value Ref Range    PT 14.6 12.0 - 14.6 sec    INR 1.11 0.87 - 1.13       Fluids    Intake/Output Summary (Last 24 hours) at 06/15/19 1958  Last data filed at 06/15/19 1600   Gross per 24 hour   Intake             2730 ml   Output             2000 ml   Net              730 ml       Core Measures & Quality Metrics  Labs reviewed and Medications reviewed  Randle catheter: No Randle      DVT Prophylaxis: Contraindicated - High bleeding risk  DVT prophylaxis - mechanical: SCDs  Ulcer prophylaxis: Not indicated        LARISA Score  ETOH Screening    Assessment/Plan  Rattlesnake bite- (present on admission)   Assessment & Plan    Rattlesnake bite to right foot.  Received 6 vials of CroFab at referring facility.  Continue maintenance dose Crofab.  6/14 maintenance dose completed - swelling and erythema later noted extending up    the right posterior/ lateral thigh to gluteus - rebolus with Crofab and then restart maintenance infusion  6/15 finish maintenance infusion and follow exam of RLE     Contraindication to deep vein thrombosis (DVT) prophylaxis- (present on admission)   Assessment & Plan    Systemic anticoagulation contraindicated secondary to elevated bleeding risk.  6/15 - trauma duplex ordered     Trauma- (present on admission)   Assessment & Plan    Rattlesnake bite to right foot  Trauma Green Transfer Activation.  Kyrie Rivera MD. Trauma Surgery.       Discussed patient condition with Family, RN, RT, Pharmacy, Patient and trauma surgery.  CRITICAL CARE TIME EXCLUDING PROCEDURES: 36  minutes

## 2019-06-16 NOTE — PROGRESS NOTES
Trauma / Surgical Daily Progress Note    Date of Service  6/16/2019    Chief Complaint  28 y.o. male admitted 6/13/2019 with Trauma    Interval Events  Hospital day #3 rattle snake bite to right foot  Last Crofab dose on 6/15 at 1430  Pain and swelling improving  Ambulating around room, father to bring in crutches  SBIRT/RAP complete   Stable for transfer to [facility], Dr. Rivera notified    Review of Systems  Review of Systems   Constitutional: Negative for fever and malaise/fatigue.   Eyes: Negative for blurred vision and double vision.   Respiratory: Negative for cough and shortness of breath.    Cardiovascular: Positive for leg swelling (RLE). Negative for chest pain.   Gastrointestinal: Negative for abdominal pain, constipation, nausea and vomiting.        BM 6/14   Genitourinary:        Voiding   Neurological: Negative for sensory change and headaches.        Vital Signs  Temp:  [36.6 °C (97.8 °F)-36.9 °C (98.4 °F)] 36.9 °C (98.4 °F)  Pulse:  [34-73] 38  Resp:  [13-28] 16  SpO2:  [95 %-100 %] 98 %    Physical Exam  Physical Exam   Constitutional: He is oriented to person, place, and time. He appears well-developed. No distress.   HENT:   Head: Normocephalic.   Eyes: Conjunctivae are normal.   Neck: Neck supple.   Cardiovascular: Normal rate and normal heart sounds.    Pulmonary/Chest: Effort normal and breath sounds normal. No respiratory distress. He exhibits no tenderness.   Room air   Abdominal: Soft. Bowel sounds are normal. He exhibits no distension. There is no tenderness.   Musculoskeletal: He exhibits edema and tenderness.   RLE, ecchymosis from top of foot up the lateral thigh/buttock   Neurological: He is alert and oriented to person, place, and time.   Skin: Skin is warm and dry.   Psychiatric: He has a normal mood and affect. His behavior is normal.   Nursing note and vitals reviewed.      Laboratory  Recent Results (from the past 24 hour(s))   CBC WITHOUT DIFFERENTIAL    Collection Time: 06/15/19  2:10  PM   Result Value Ref Range    WBC 7.4 4.8 - 10.8 K/uL    RBC 5.04 4.70 - 6.10 M/uL    Hemoglobin 13.8 (L) 14.0 - 18.0 g/dL    Hematocrit 43.7 42.0 - 52.0 %    MCV 86.7 81.4 - 97.8 fL    MCH 27.4 27.0 - 33.0 pg    MCHC 31.6 (L) 33.7 - 35.3 g/dL    RDW 39.0 35.9 - 50.0 fL    Platelet Count 234 164 - 446 K/uL    MPV 10.2 9.0 - 12.9 fL   FIBRINOGEN    Collection Time: 06/15/19  2:10 PM   Result Value Ref Range    Fibrinogen 284 215 - 460 mg/dL   Prothrombin Time    Collection Time: 06/15/19  2:10 PM   Result Value Ref Range    PT 13.9 12.0 - 14.6 sec    INR 1.05 0.87 - 1.13   CBC WITHOUT DIFFERENTIAL    Collection Time: 06/15/19  6:00 PM   Result Value Ref Range    WBC 8.7 4.8 - 10.8 K/uL    RBC 4.36 (L) 4.70 - 6.10 M/uL    Hemoglobin 12.2 (L) 14.0 - 18.0 g/dL    Hematocrit 37.6 (L) 42.0 - 52.0 %    MCV 86.2 81.4 - 97.8 fL    MCH 28.0 27.0 - 33.0 pg    MCHC 32.4 (L) 33.7 - 35.3 g/dL    RDW 38.4 35.9 - 50.0 fL    Platelet Count 183 164 - 446 K/uL    MPV 10.0 9.0 - 12.9 fL   FIBRINOGEN    Collection Time: 06/15/19  6:00 PM   Result Value Ref Range    Fibrinogen 247 215 - 460 mg/dL   Prothrombin Time    Collection Time: 06/15/19  6:00 PM   Result Value Ref Range    PT 14.6 12.0 - 14.6 sec    INR 1.11 0.87 - 1.13   Comp Metabolic Panel    Collection Time: 06/16/19  5:05 AM   Result Value Ref Range    Sodium 137 135 - 145 mmol/L    Potassium 3.8 3.6 - 5.5 mmol/L    Chloride 111 96 - 112 mmol/L    Co2 22 20 - 33 mmol/L    Anion Gap 4.0 0.0 - 11.9    Glucose 103 (H) 65 - 99 mg/dL    Bun 11 8 - 22 mg/dL    Creatinine 0.82 0.50 - 1.40 mg/dL    Calcium 8.1 (L) 8.5 - 10.5 mg/dL    AST(SGOT) 13 12 - 45 U/L    ALT(SGPT) 9 2 - 50 U/L    Alkaline Phosphatase 34 30 - 99 U/L    Total Bilirubin 0.5 0.1 - 1.5 mg/dL    Albumin 3.1 (L) 3.2 - 4.9 g/dL    Total Protein 5.0 (L) 6.0 - 8.2 g/dL    Globulin 1.9 1.9 - 3.5 g/dL    A-G Ratio 1.6 g/dL   CBC WITHOUT DIFFERENTIAL    Collection Time: 06/16/19  5:05 AM   Result Value Ref Range    WBC  6.5 4.8 - 10.8 K/uL    RBC 4.12 (L) 4.70 - 6.10 M/uL    Hemoglobin 11.9 (L) 14.0 - 18.0 g/dL    Hematocrit 35.3 (L) 42.0 - 52.0 %    MCV 85.7 81.4 - 97.8 fL    MCH 28.9 27.0 - 33.0 pg    MCHC 33.7 33.7 - 35.3 g/dL    RDW 37.4 35.9 - 50.0 fL    Platelet Count 198 164 - 446 K/uL    MPV 10.1 9.0 - 12.9 fL   ESTIMATED GFR    Collection Time: 06/16/19  5:05 AM   Result Value Ref Range    GFR If African American >60 >60 mL/min/1.73 m 2    GFR If Non African American >60 >60 mL/min/1.73 m 2       Fluids    Intake/Output Summary (Last 24 hours) at 06/16/19 1357  Last data filed at 06/16/19 1000   Gross per 24 hour   Intake             3660 ml   Output             3060 ml   Net              600 ml       Core Measures & Quality Metrics  Radiology images reviewed, Labs reviewed and Medications reviewed  Randle catheter: No Randle      DVT Prophylaxis: Not indicated at this time, ambulatory  DVT prophylaxis - mechanical: SCDs  Ulcer prophylaxis: Yes        Total Score: 0    ETOH Screening  CAGE Score: 0  Assessment complete date: 6/16/2019        Assessment/Plan  Rattlesnake bite- (present on admission)   Assessment & Plan    Rattlesnake bite to right foot.  Received 6 vials of CroFab at referring facility.  Continue maintenance dose Crofab.  6/14 Maintenance dose completed - swelling and erythema later noted extending up the right posterior/ lateral thigh to gluteus - rebolus with Crofab and then restart maintenance infusion  6/15 Finish maintenance infusion and follow exam of RLE      Contraindication to deep vein thrombosis (DVT) prophylaxis- (present on admission)   Assessment & Plan    Systemic anticoagulation contraindicated secondary to elevated bleeding risk.  6/15 - trauma duplex ordered     Trauma- (present on admission)   Assessment & Plan    Rattlesnake bite to right foot  Trauma Green Transfer Activation.  Kyrie Rivera MD. Trauma Surgery.       Discussed patient condition with RN, Patient and trauma surgery.   Kalpesh    Patient seen, data reviewed and discussed.  Agree with assessment and plan.  ORACIO

## 2019-06-16 NOTE — CARE PLAN
Problem: Venous Thromboembolism (VTW)/Deep Vein Thrombosis (DVT) Prevention:  Goal: Patient will participate in Venous Thrombosis (VTE)/Deep Vein Thrombosis (DVT)Prevention Measures  SCDs on to LLE.   Monitoring extremities, vascular checks q1h.   Monitoring labs, and s/s of DVTs.     Problem: Bowel/Gastric:  Goal: Normal bowel function is maintained or improved  Patient tolerating regular diet  Encouraging increased activity as tolerated, discussed interventions to establish bowel pattern.   Last BM 6/15 per patient.

## 2019-06-16 NOTE — CARE PLAN
Problem: Infection  Goal: Will remain free from infection    Intervention: Assess signs and symptoms of infection  Assess leg and effected area of snake bite for s/s of infection throughout shift. Educate patient on s/s of infection.       Problem: Pain Management  Goal: Pain level will decrease to patient's comfort goal    Intervention: Follow pain managment plan developed in collaboration with patient and Interdisciplinary Team  Educate patient on breakthrough pain and use of 1-10 pain rating scale. Administer pain medication as needed and as ordered.

## 2019-06-17 VITALS
RESPIRATION RATE: 18 BRPM | WEIGHT: 173.94 LBS | DIASTOLIC BLOOD PRESSURE: 65 MMHG | SYSTOLIC BLOOD PRESSURE: 128 MMHG | HEART RATE: 42 BPM | TEMPERATURE: 98.5 F | OXYGEN SATURATION: 98 % | HEIGHT: 68 IN | BODY MASS INDEX: 26.36 KG/M2

## 2019-06-17 PROCEDURE — A9270 NON-COVERED ITEM OR SERVICE: HCPCS | Performed by: NURSE PRACTITIONER

## 2019-06-17 PROCEDURE — 700102 HCHG RX REV CODE 250 W/ 637 OVERRIDE(OP): Performed by: NURSE PRACTITIONER

## 2019-06-17 RX ADMIN — ACETAMINOPHEN 1000 MG: 500 TABLET ORAL at 05:15

## 2019-06-17 ASSESSMENT — ENCOUNTER SYMPTOMS
ROS GI COMMENTS: BM 6/16
FEVER: 0
NAUSEA: 0
HEADACHES: 0
SENSORY CHANGE: 0
DOUBLE VISION: 0
SHORTNESS OF BREATH: 0
CONSTIPATION: 0
ABDOMINAL PAIN: 0
VOMITING: 0
BLURRED VISION: 0
COUGH: 0

## 2019-06-17 NOTE — PROGRESS NOTES
Trauma / Surgical Daily Progress Note    Date of Service  6/17/2019    Chief Complaint  28 y.o. male admitted 6/13/2019 with Trauma    Interval Events  Transfer from ICU to [facility]  Swelling and erythema/ecchymosis stable/improving  Adequate pain control  Ambulating independently with crutches  Medically cleared for discharge    Review of Systems  Review of Systems   Constitutional: Negative for fever and malaise/fatigue.   Eyes: Negative for blurred vision and double vision.   Respiratory: Negative for cough and shortness of breath.    Cardiovascular: Positive for leg swelling (RLE). Negative for chest pain.   Gastrointestinal: Negative for abdominal pain, constipation, nausea and vomiting.        BM 6/16   Genitourinary:        Voiding   Neurological: Negative for sensory change and headaches.        Vital Signs  Temp:  [36.8 °C (98.2 °F)-37.2 °C (98.9 °F)] 36.9 °C (98.5 °F)  Pulse:  [42-61] 42  Resp:  [16-20] 18  BP: (128-142)/(65-76) 128/65  SpO2:  [95 %-99 %] 98 %    Physical Exam  Physical Exam   Constitutional: He is oriented to person, place, and time. He appears well-developed. No distress.   HENT:   Head: Normocephalic.   Eyes: Conjunctivae are normal.   Neck: Neck supple.   Cardiovascular: Normal rate and normal heart sounds.    Pulmonary/Chest: Effort normal and breath sounds normal. No respiratory distress. He exhibits no tenderness.   Room air   Abdominal: Soft. Bowel sounds are normal. He exhibits no distension. There is no tenderness.   Musculoskeletal: He exhibits edema and tenderness.   RLE, ecchymosis from top of foot up the lateral thigh/buttock - unchanged   Neurological: He is alert and oriented to person, place, and time.   Skin: Skin is warm and dry.   Psychiatric: He has a normal mood and affect. His behavior is normal.   Nursing note and vitals reviewed.      Laboratory  No results found for this or any previous visit (from the past 24 hour(s)).    Fluids    Intake/Output Summary (Last 24  hours) at 06/17/19 1001  Last data filed at 06/17/19 0403   Gross per 24 hour   Intake              990 ml   Output              800 ml   Net              190 ml       Core Measures & Quality Metrics  Radiology images reviewed, Labs reviewed and Medications reviewed  Randle catheter: No Randle      DVT Prophylaxis: Not indicated at this time, ambulatory  DVT prophylaxis - mechanical: SCDs  Ulcer prophylaxis: Yes        Total Score: 0    ETOH Screening  CAGE Score: 0  Assessment complete date: 6/16/2019        Assessment/Plan  Rattlesnake bite- (present on admission)   Assessment & Plan    Rattlesnake bite to right foot.  Received 6 vials of CroFab at referring facility.  Continue maintenance dose Crofab.  6/14 Maintenance dose completed - swelling and erythema later noted extending up the right posterior/ lateral thigh to gluteus - rebolus with Crofab and then restart maintenance infusion  6/15 Finish maintenance infusion and follow exam of RLE   Swelling and erythema continues to improve      Contraindication to deep vein thrombosis (DVT) prophylaxis- (present on admission)   Assessment & Plan    Systemic anticoagulation contraindicated secondary to elevated bleeding risk.  6/16  Trauma screening bilateral lower extremity venous duplex negative for above knee DVT.     Trauma- (present on admission)   Assessment & Plan    Rattlesnake bite to right foot  Trauma Green Transfer Activation.  Kyrie Rivera MD. Trauma Surgery.         Discussed patient condition with RN, Patient and trauma surgery. Dr. Rivera

## 2019-06-17 NOTE — PROGRESS NOTES
Patient transferred to [facility] via wheelchair accompanied by transport tech  Chart and personal belongings with patient.   Patient's mom present during transport.

## 2019-06-17 NOTE — PROGRESS NOTES
2 RN Check    PIV to right and left AC's.  Redness noted on elbows, blanching.  Puncture wound noted on right foot from original snake bite.   Bruising to Right thigh and foot, with previous marker outlines.  Overall swelling to right leg.    All other skin intact.

## 2019-06-17 NOTE — DISCHARGE INSTRUCTIONS
Discharge Instructions    Discharged to home by car with relative. Discharged via wheelchair, hospital escort: Yes.  Special equipment needed: Not Applicable    Be sure to schedule a follow-up appointment with your primary care doctor or any specialists as instructed.     Discharge Plan:     - Call or seek medical attention for questions or concerns   - Follow up with Dr. Rivera or primary care provider in 1 weeks time   - Resume regular diet   - May take over the counter acetaminophen or ibuprofen as needed for pain   - Continue daily over the counter stool softener while on narcotics   - No operation of machinery or motorized vehicles while under the influence of narcotics   - No alcohol, marijuana or illicit drug use while under the influence of narcotics   - No swimming, hot tubs, baths or wound submersion until cleared by outpatient provider. May shower   - No contact sports, strenuous activities, or heavy lifting until cleared by outpatient provider   - If respiratory decompensation, uncontrolled pain, or signs or symptoms of infection occur seek medical attention    Diet Plan: Discussed  Activity Level: Discussed  Confirmed Follow up Appointment: Patient to Call and Schedule Appointment  Confirmed Symptoms Management: Discussed  Medication Reconciliation Updated: Yes  Influenza Vaccine Indication: Patient Refuses    I understand that a diet low in cholesterol, fat, and sodium is recommended for good health. Unless I have been given specific instructions below for another diet, I accept this instruction as my diet prescription.   Other diet: Regular    Special Instructions: None    · Is patient discharged on Warfarin / Coumadin?   No       Snake Bite  Snakes may be poisonous (venomous) or nonpoisonous (nonvenomous). A bite from a nonvenomous snake may cause a wound to the skin and possibly to the deeper tissues beneath the skin. A venomous snake will cause a wound and may also inject poison (venom) into the  wound.  The effects of snake venom vary depending on the type of snake. In some cases, the effects can be extremely serious or even deadly. A bite from a venomous snake is a medical emergency. Treatment may require the use of antivenom medicine.  What are the signs or symptoms?  Symptoms of a snake bite vary depending on the type of snake, whether the snake is venomous, and the severity of the bite. Symptoms for both a venomous or nonvenomous snake may include:  · Pain, redness, and swelling at the site of the bite.  · Skin discoloration at the site of the bite.  · A feeling of nervousness.  Symptoms of a venomous snake bite may also include:  · Increasing pain and swelling.  · Severe anxiety or confusion.  · Blood blisters or purple spots in the bite area.  · Nausea and vomiting.  · Numbness or tingling.  · Muscle weakness.  · Excessive fatigue or drowsiness.  · Excessive sweating.  · Difficulty breathing.  · Blurred vision.  · Bruising and bleeding at the site of the bite.  · Feeling faint or light-headed.  In some cases, symptoms do not develop until a few hours after the bite.  How is this diagnosed?  This condition may be diagnosed based on symptoms and a physical exam. Your health care provider will examine the bite area and ask for details about the snake to help determine whether it is venomous. You may also have tests, including blood tests.  How is this treated?  Treatment depends on the severity of the bite and whether the snake is venomous.  · Treatment for nonvenomous snake bites may involve basic wound care. This often includes cleaning the wound and applying a bandage (dressing). In some cases, antibiotic medicine or a tetanus shot may be given.  · Treatment for venomous snake bites may include antivenom medicine in addition to wound care. This medicine needs to be given as soon as possible after the bite. Other treatments may be needed to help control symptoms as they develop. You may need to stay in  a hospital so your condition can be monitored.  Follow these instructions at home:  Wound care  · Follow instructions from your health care provider about how to take care of your wound. Make sure you:  ¨ Wash your hands with soap and water before you change your dressing. If soap and water are not available, use hand .  ¨ Change your dressing as told by your health care provider.  · Keep the bite area clean and dry. Wash the bite area daily with soap and water or an antiseptic as told by your health care provider.  · Check your wound every day for signs of infection. Watch for:  ¨ Redness, swelling, or pain that is getting worse.  ¨ Fluid, blood, or pus.  · If you develop blistering at the site of the bite, protect the blisters from breaking. Do not attempt to open a blister.  Medicines  · Take or apply over-the-counter and prescription medicines only as told by your health care provider.  · If you were prescribed an antibiotic, take or apply it as told by your health care provider. Do not stop using the antibiotic even if your condition improves.  General instructions  · Keep the affected area raised (elevated) above the level of your heart while you are sitting or lying down, if possible.  · Keep all follow-up visits as told by your health care provider. This is important.  Contact a health care provider if:  · You have increased redness, swelling, or pain at the site of your wound.  · You have fluid, blood, or pus coming from your wound.  · You have a fever.  Get help right away if:  · You develop blood blisters or purple spots in the bite area.  · You have nausea or vomiting.  · You have numbness or tingling.  · You have excessive sweating.  · You have trouble breathing.  · You have vision problems.  · You feel very confused.  · You feel faint or light-headed.  This information is not intended to replace advice given to you by your health care provider. Make sure you discuss any questions you have with  your health care provider.  Document Released: 12/15/2001 Document Revised: 05/23/2017 Document Reviewed: 05/04/2016  Elsevier Interactive Patient Education © 2017 [employer]    Crotalidae Polyvalent Immune Manju, Ovine injection  What is this medicine?  CROTALIDAE POLYVALALENT IMMUNE MANJU is an anti-venom. It is used to treat patients who have been bitten by certain poisonous snakes, like rattlesnakes and water moccasins.  This medicine may be used for other purposes; ask your health care provider or pharmacist if you have questions.  COMMON BRAND NAME(S): CroFab  What should I tell my health care provider before I take this medicine?  They need to know if you have any of these conditions:  -bleeding disorders or blood disease  -an unusual or allergic reaction to crotalidae polyvalent immune manju, papaya, papain, latex, dust mites, other medicines, foods, dyes, or preservatives  -pregnant or trying to get pregnant  -breast-feeding  How should I use this medicine?  This medicine is for infusion into a vein. It is given by a health care professional in a hospital or clinic setting.  Talk to your pediatrician regarding the use of this medicine in children. Special care may be needed.  Overdosage: If you think you have taken too much of this medicine contact a poison control center or emergency room at once.  NOTE: This medicine is only for you. Do not share this medicine with others.  What if I miss a dose?  This does not apply.  What may interact with this medicine?  Interactions are not expected.  This list may not describe all possible interactions. Give your health care provider a list of all the medicines, herbs, non-prescription drugs, or dietary supplements you use. Also tell them if you smoke, drink alcohol, or use illegal drugs. Some items may interact with your medicine.  What should I watch for while using this medicine?  Your condition will be monitored carefully while you are receiving this  medicine.  After you receive this medicine watch for any symptoms of bruising or bleeding. Call your doctor right away if you have nosebleeds, excessive bleeding after brushing teeth, blood in the stools or urine, excessive menstrual bleeding, excessive bruising, or persistent oozing from wounds. You may need more medicine.  What side effects may I notice from receiving this medicine?  Side effects that you should report to your doctor or health care professional as soon as possible:  -allergic reactions like skin rash, itching or hives, swelling of the face, lips, or tongue  -breathing problems  -dark urine  -dark, tarry stools  -fever  -low blood pressure  -unusual bleeding or bruising  Side effects that usually do not require medical attention (report to your doctor or health care professional if they continue or are bothersome):  -aches and pains  -cough  -loss of appetite  -nervousness  -tingling pain  This list may not describe all possible side effects. Call your doctor for medical advice about side effects. You may report side effects to FDA at 7-479-FDA-1656.  Where should I keep my medicine?  This drug is given in a hospital or clinic and will not be stored at home.  NOTE: This sheet is a summary. It may not cover all possible information. If you have questions about this medicine, talk to your doctor, pharmacist, or health care provider.  © 2018 Elsevier/Gold Standard (2009-03-24 15:04:29)  Depression / Suicide Risk    As you are discharged from this [facility] facility, it is important to learn how to keep safe from harming yourself.    Recognize the warning signs:  · Abrupt changes in personality, positive or negative- including increase in energy   · Giving away possessions  · Change in eating patterns- significant weight changes-  positive or negative  · Change in sleeping patterns- unable to sleep or sleeping all the time   · Unwillingness or inability to communicate  · Depression  · Unusual  sadness, discouragement and loneliness  · Talk of wanting to die  · Neglect of personal appearance   · Rebelliousness- reckless behavior  · Withdrawal from people/activities they love  · Confusion- inability to concentrate     If you or a loved one observes any of these behaviors or has concerns about self-harm, here's what you can do:  · Talk about it- your feelings and reasons for harming yourself  · Remove any means that you might use to hurt yourself (examples: pills, rope, extension cords, firearm)  · Get professional help from the community (Mental Health, Substance Abuse, psychological counseling)  · Do not be alone:Call your Safe Contact- someone whom you trust who will be there for you.  · Call your local CRISIS HOTLINE 315-7613 or 288-917-7048  · Call your local Children's Mobile Crisis Response Team [place] (597) 774-8497 or www.HowGood  · Call the toll free National Suicide Prevention Hotlines   · National Suicide Prevention Lifeline 179-311-NHUP (4942)  · National Hope Line Network 800-SUICIDE (622-4393)

## 2019-06-17 NOTE — DISCHARGE SUMMARY
Trauma Discharge Summary    DATE OF ADMISSION: 6/13/2019    DATE OF DISCHARGE: 6/17/2019    LENGTH OF STAY: 4 days    ATTENDING PHYSICIAN: Kyrie Rivera M.D. trauma surgery    CONSULTING PHYSICIAN:   None    DISCHARGE DIAGNOSIS:  1.  Rattlesnake bite  2.  Contraindication to deep vein thrombosis prophylaxis  3.  Trauma    PROCEDURES:  None    HISTORY OF PRESENT ILLNESS: The patient is a 28 y.o. male who sustained a rattlesnake bite.  He was initially seen in [place] and received antivenom.  He was subsequently transferred to [facility] for definite trauma care.  He was triaged as a Trauma in accordance with established pre-hospital protocols.    HOSPITAL COURSE: On arrival, he underwent extensive radiographic and laboratory studies and was admitted to the critical care team under the direction and supervision of Dr. Rivera.  He sustained the listed injuries and incurred the listed diagnosis during his stay.    He was transferred from the emergency department to the trauma intensive care unit where a tertiary exam was performed.     Again the patient sustained a rattlesnake bite to the right foot.  He received 6 vials of CroFab at the referring facility.  He continued a maintenance dose of CroFab upon arrival to [facility].  Maintenance dose was completed on 6/14/2019 however swelling and erythema was later noted extending up the right posterior and lateral thigh to the gluteus and the patient was re-bolused with CroFab and then restarted on maintenance infusion.  This maintenance infusion was completed on 6/15/2019 and the patient's physical exam remained stable with swelling and erythema improving in presentation.    Systemic anticoagulation was contraindicated secondary to elevated bleeding risk.  The patient underwent a trauma screening of the bilateral below right extremity venous duplex which was negative for DVT.    He was medically stable for transfer to the general  surgical [facility] on 6/16/2019.    On the day of discharge the patient is up ambulating independently with crutches.  He is reporting adequate pain control.  His right lower extremity swelling has stabilized and continues to improve.  There is some ecchymosis to the right posterior lateral thigh however the erythema has not extended.  The patient is on room air with oxygen saturations greater than 92%.  He is eating a regular diet, voiding and having bowel movements as expected.  He is eager and feeling well enough for discharge home.    DISCHARGE PHYSICAL EXAM: See epic physical exam dated 6/17/2019    DISCHARGE MEDICATIONS:  I reviewed the patients controlled substance history and obtained a controlled substance use informed consent (if applicable) provided by [facility] and the patient has been prescribed.       Medication List      CONTINUE taking these medications      Instructions   acetaminophen 500 MG Tabs  Commonly known as:  TYLENOL   Take 500 mg by mouth 1 time daily as needed (Headache).  Dose:  500 mg            DISPOSITION: The patient will be discharged home in stable condition on 6/17/2009. He will follow up with Dr. Rivera or his primary care provider in 1 week.  He has been instructed to not return to work for approximately 2 weeks and will need to return on light duty for several weeks after that.    The patient and family have been extensively counseled and all questions have been answered. Special attention was paid to respiratory decompensation, uncontrolled pain and signs and symptoms of infection and to seek immediate medical attention if these develop. The patient demonstrates understanding and gives verbal compliance with discharge instructions.    TIME SPENT ON DISCHARGE: 45 minutes      ____________________________________________  IDALMIS Mast    DD: 6/17/2019 10:26 AM

## 2019-06-17 NOTE — PROGRESS NOTES
Report received from day shift RN, assumed Care.   Patient is AOx4, responds appropriately.      Pt reports pain 1/10, declines any pain medication at this time.   Patient is tolerating regular diet, denies nausea/vomiting. + flatus.   RLE pulses +2 and easily palpable (pedal and posterior tibial). Pt denies any numbness or tingling in affected limb.   Up self with steady gait.    Plan of care discussed, all questions answered.    Educated on use of call light and importance of calling before getting out of bed. Pt verbalizes understanding.    Call light and belongings within reach, treaded slipper socks on, bed in lowest locked position.  All needs met at this time.

## 2019-06-17 NOTE — CARE PLAN
Problem: Safety  Goal: Will remain free from falls  Outcome: PROGRESSING AS EXPECTED  Educate pt on level of fall risk and ensure the room is well lit and free from obstacles.     Problem: Bowel/Gastric:  Goal: Normal bowel function is maintained or improved  Outcome: PROGRESSING AS EXPECTED  Perform frequent bowel assessment and administer prescribed bowel medications per MAR.

## 2019-06-17 NOTE — PROGRESS NOTES
"Report received from NOC shift RN.   A/O X 4.   Room air.   Pt baseline bradycardic.  /65   Pulse (!) 42   Temp 36.9 °C (98.5 °F) (Temporal)   Resp 18   Ht 1.727 m (5' 8\")   Wt 78.9 kg (173 lb 15.1 oz)   SpO2 98%   BMI 26.45 kg/m²      Labs reviewed.   Ca at 8.1.   Albumin 3.1  Total protein at 5.0    Pt not reporting pain this am.   RLE elevated.   Palpable pulses are present on pedal and post tibial areas.   Edema is present but erythema has not advanced past marked areas on leg.   Bite marks visible on dorsal aspect of foot.   Extensive bruising present along biceps femoris on RLE, firm to the touch.     +BM. +void.     Pt ambulating with standby assist.     Mitch light at bedside, no additional needs.     "

## 2020-11-06 NOTE — ED NOTES
PT transfer from [place]. Bite on R foot. 6g Crofab pta.  alllergic to bees.   Pt discharged per MD order. IV removed. Catheter tip intact. No distress noted. Discharge instructions explained. AVS given to patient and placed in Blue Folder. Pt verbalized understanding. VSS. Afebrile. No complaints of pain, N/V, diarrhea, or SOB.  Pt left with belongings to Main Entrance. Family with patient.